# Patient Record
Sex: MALE | URBAN - METROPOLITAN AREA
[De-identification: names, ages, dates, MRNs, and addresses within clinical notes are randomized per-mention and may not be internally consistent; named-entity substitution may affect disease eponyms.]

---

## 2017-02-01 ENCOUNTER — IMPORTED ENCOUNTER (OUTPATIENT)
Dept: URBAN - METROPOLITAN AREA CLINIC 1 | Facility: CLINIC | Age: 57
End: 2017-02-01

## 2017-02-01 PROBLEM — H52.4: Noted: 2017-02-01

## 2017-02-01 PROCEDURE — S0621 ROUTINE OPHTHALMOLOGICAL EXA: HCPCS

## 2017-03-01 ENCOUNTER — IMPORTED ENCOUNTER (OUTPATIENT)
Dept: URBAN - METROPOLITAN AREA CLINIC 1 | Facility: CLINIC | Age: 57
End: 2017-03-01

## 2017-03-01 PROBLEM — H25.813: Noted: 2017-03-01

## 2017-03-01 PROBLEM — H16.143: Noted: 2017-03-01

## 2017-03-01 PROBLEM — H04.123: Noted: 2017-03-01

## 2017-03-01 PROBLEM — H01.004: Noted: 2017-03-01

## 2017-03-01 PROBLEM — H02.831: Noted: 2017-03-01

## 2017-03-01 PROBLEM — H02.834: Noted: 2017-03-01

## 2017-03-01 PROBLEM — H01.001: Noted: 2017-03-01

## 2017-03-01 PROBLEM — H10.45: Noted: 2017-03-01

## 2017-03-01 PROCEDURE — 92014 COMPRE OPH EXAM EST PT 1/>: CPT

## 2017-03-01 NOTE — PATIENT DISCUSSION
1.  Cataract OU: PMG did not see the patient today. Visually Significant secondary to glare. Discussed the risks benefits alternatives and limitations of cataract surgery. The patient stated a full understanding and a desire to proceed with the procedure. The patient will need to return for preop appointment with cataract measurements and to have any additional questions answered and start pre-operative eye drops as directed. Phaco PCL OS then OD Otherwise follow-up2. Dermatochalasis OU UL's  - Follow with no intervention at this time. VF showed 15-20 degrees difference taped OU. Will re evaluate after phaco. 3.  WINSTON w/ PEK OU-The use/continuation of artificial tears were recommended. 4.  Allergic Conjunctivitis OU :  Condition discussed with patient. Advised patient to use OTC Zaditor one drop OU BID prn.5. Blepharitis anterior type OU - Daily warm compresses and lid scrubs were recommended. Return for an appointment for Ascvince/H and P. with Dr. Saran Perez.

## 2019-02-01 ENCOUNTER — IMPORTED ENCOUNTER (OUTPATIENT)
Dept: URBAN - METROPOLITAN AREA CLINIC 1 | Facility: CLINIC | Age: 59
End: 2019-02-01

## 2019-02-01 PROBLEM — H52.13: Noted: 2019-02-01

## 2019-02-01 PROCEDURE — S0621 ROUTINE OPHTHALMOLOGICAL EXA: HCPCS

## 2019-02-01 NOTE — PATIENT DISCUSSION
1. Myopia OU- Rx for glasses given 2. Dermatochalasis OU UL's  - Follow with no intervention at this time. VF showed 15-20 degrees difference taped OU. Will re evaluate after phaco. 3.  WINSTON w/ PEK OU- Use ATs TID OU Routinely. 4.  Allergic Conjunctivitis OU - controlled. Use Zaditor BID OU PRN. 5.  Blepharitis anterior type OU - Cont Daily warm compresses and lid scrubs were recommended. 6. Cats OU- observe. Return for an appointment in 1 yr 36 with Dr. Margarita Rapp. Return for an appointment in 3 months 30 with Dr. Margarita Rapp.

## 2019-03-13 ENCOUNTER — IMPORTED ENCOUNTER (OUTPATIENT)
Dept: URBAN - METROPOLITAN AREA CLINIC 1 | Facility: CLINIC | Age: 59
End: 2019-03-13

## 2019-03-13 NOTE — PATIENT DISCUSSION
Patient wanted MRX rechecked before buying new glasses. Patient said he had the last appt of the day on 02/01/19 and he felt very rushed by tech during exam. Rechecked MRX for patient and consulted with RBF about the changes. Ok per RBF to finalize and give to patient. Patient was confident with MRX and did not feel necessary to see RBF.

## 2019-12-10 ENCOUNTER — ANTI-COAG VISIT (OUTPATIENT)
Dept: CARDIOLOGY CLINIC | Age: 59
End: 2019-12-10

## 2019-12-10 ENCOUNTER — OFFICE VISIT (OUTPATIENT)
Dept: CARDIOLOGY CLINIC | Age: 59
End: 2019-12-10

## 2019-12-10 ENCOUNTER — HOSPITAL ENCOUNTER (OUTPATIENT)
Dept: LAB | Age: 59
Discharge: HOME | End: 2019-12-10
Payer: COMMERCIAL

## 2019-12-10 VITALS
SYSTOLIC BLOOD PRESSURE: 146 MMHG | HEART RATE: 57 BPM | WEIGHT: 234 LBS | DIASTOLIC BLOOD PRESSURE: 82 MMHG | HEIGHT: 70 IN | BODY MASS INDEX: 33.5 KG/M2 | OXYGEN SATURATION: 97 %

## 2019-12-10 DIAGNOSIS — Z79.01 LONG TERM (CURRENT) USE OF ANTICOAGULANTS: Primary | ICD-10-CM

## 2019-12-10 DIAGNOSIS — R07.9 CHEST PAIN, UNSPECIFIED TYPE: Primary | ICD-10-CM

## 2019-12-10 DIAGNOSIS — R07.9 CHEST PAIN, UNSPECIFIED TYPE: ICD-10-CM

## 2019-12-10 DIAGNOSIS — I82.4Z9 DEEP VEIN THROMBOSIS (DVT) OF DISTAL VEIN OF LOWER EXTREMITY, UNSPECIFIED CHRONICITY, UNSPECIFIED LATERALITY (HCC): ICD-10-CM

## 2019-12-10 LAB
ALBUMIN SERPL-MCNC: 3.2 G/DL (ref 3.4–5)
ALBUMIN/GLOB SERPL: 0.9 {RATIO} (ref 0.8–1.7)
ALP SERPL-CCNC: 71 U/L (ref 45–117)
ALT SERPL-CCNC: 15 U/L (ref 16–61)
ANION GAP SERPL CALC-SCNC: 4 MMOL/L (ref 3–18)
AST SERPL-CCNC: 15 U/L (ref 10–38)
BASOPHILS # BLD: 0 K/UL (ref 0–0.1)
BASOPHILS NFR BLD: 0 % (ref 0–2)
BILIRUB SERPL-MCNC: 0.5 MG/DL (ref 0.2–1)
BUN SERPL-MCNC: 15 MG/DL (ref 7–18)
BUN/CREAT SERPL: 11 (ref 12–20)
CALCIUM SERPL-MCNC: 8.6 MG/DL (ref 8.5–10.1)
CHLORIDE SERPL-SCNC: 103 MMOL/L (ref 100–111)
CO2 SERPL-SCNC: 35 MMOL/L (ref 21–32)
CREAT SERPL-MCNC: 1.33 MG/DL (ref 0.6–1.3)
DIFFERENTIAL METHOD BLD: ABNORMAL
EOSINOPHIL # BLD: 0.1 K/UL (ref 0–0.4)
EOSINOPHIL NFR BLD: 1 % (ref 0–5)
ERYTHROCYTE [DISTWIDTH] IN BLOOD BY AUTOMATED COUNT: 14.5 % (ref 11.6–14.5)
GLOBULIN SER CALC-MCNC: 3.4 G/DL (ref 2–4)
GLUCOSE SERPL-MCNC: 95 MG/DL (ref 74–99)
HCT VFR BLD AUTO: 40.8 % (ref 36–48)
HGB BLD-MCNC: 13.2 G/DL (ref 13–16)
INR BLD: 2.5
LYMPHOCYTES # BLD: 2 K/UL (ref 0.9–3.6)
LYMPHOCYTES NFR BLD: 23 % (ref 21–52)
MCH RBC QN AUTO: 28.1 PG (ref 24–34)
MCHC RBC AUTO-ENTMCNC: 32.4 G/DL (ref 31–37)
MCV RBC AUTO: 87 FL (ref 74–97)
MONOCYTES # BLD: 0.5 K/UL (ref 0.05–1.2)
MONOCYTES NFR BLD: 6 % (ref 3–10)
NEUTS SEG # BLD: 6 K/UL (ref 1.8–8)
NEUTS SEG NFR BLD: 70 % (ref 40–73)
PLATELET # BLD AUTO: 253 K/UL (ref 135–420)
PMV BLD AUTO: 10.9 FL (ref 9.2–11.8)
POTASSIUM SERPL-SCNC: 3.8 MMOL/L (ref 3.5–5.5)
PROT SERPL-MCNC: 6.6 G/DL (ref 6.4–8.2)
PT POC: 29.5 SECONDS
RBC # BLD AUTO: 4.69 M/UL (ref 4.7–5.5)
SODIUM SERPL-SCNC: 142 MMOL/L (ref 136–145)
VALID INTERNAL CONTROL?: YES
WBC # BLD AUTO: 8.6 K/UL (ref 4.6–13.2)

## 2019-12-10 PROCEDURE — 80053 COMPREHEN METABOLIC PANEL: CPT

## 2019-12-10 PROCEDURE — 36415 COLL VENOUS BLD VENIPUNCTURE: CPT

## 2019-12-10 PROCEDURE — 85025 COMPLETE CBC W/AUTO DIFF WBC: CPT

## 2019-12-10 RX ORDER — ENOXAPARIN SODIUM 100 MG/ML
100 INJECTION SUBCUTANEOUS EVERY 12 HOURS
Qty: 10 SYRINGE | Refills: 1 | Status: SHIPPED | OUTPATIENT
Start: 2019-12-10 | End: 2020-02-11 | Stop reason: ALTCHOICE

## 2019-12-10 RX ORDER — AMLODIPINE BESYLATE 2.5 MG/1
2.5 TABLET ORAL DAILY
COMMUNITY
End: 2020-02-11 | Stop reason: ALTCHOICE

## 2019-12-10 NOTE — PATIENT INSTRUCTIONS
Instructions Patients Name:  Jessica Méndez 1. You are scheduled to have a Left Heart Cath on December 16, 2019  at 1030 am Please check in at 0930 am . 2. Please go to [facility] and park in the outpatient parking lot that is located around to the back of the hospital and enter through the [facility] building. Once you enter through the [facility] check in with the  there. The  will either give you directions or assist you in getting to the cath holding area. 3. You are not to eat or drink anything after midnight the night before your procedure. Small sips of water to take your medications is ok. 4. If you are diabetic, do not take your insulin/sugar pill the morning of the procedure. 5. MEDICATION INSTRUCTIONS:   Please take your morning medications with the following special instructions: 
 
[x]          Please make sure to take your Blood pressure medication :  
 
[x]          Stop your Coumadin on today  and do not resume it until after the procedure. Start Lovenox starting tomorrow night. Lovenox twice a day 12 hours apart. Hold Lovenox 12 hours prior to your procedure. 6. We encourage families to wait in the waiting room on the first floor while the procedure is being done. The Doctor will come out and talk with you as soon as the procedure is over. 7. There is the possibility that you may spend the night in the hospital, depending on the results of the procedure. This will be determined after the procedure is done. If angioplasty or stent is planned, you will stay at least one day. 8. If you or your family have any questions, please call our office Monday Friday, 9:00 a. m.4:30 p.m.,  At [phone], and ask to speak to one of the nurses.

## 2019-12-10 NOTE — PATIENT INSTRUCTIONS
Verbal order and read back per Elroy Wilburn NP The INR is stable and therapeutic. Continue same dose of coumadin (Continue Coumadin 4.5mh every day except 3mg on Tues/Thur/Sat) recheck in 1 month. This INR was taken due to having upcoming procedure.  INR is normally followed by PCP

## 2019-12-10 NOTE — PROGRESS NOTES
HISTORY OF PRESENT ILLNESS  Katlyn Marshall is a 62 y.o. male. ASSESSMENT and PLAN    Mr. Katlyn Marshall has no previous documented history of CAD. He developed fatty liver in early part of 2019. He was diagnosed with transient osteoporosis in 1999 at [facility]; he has been treated with methadone. He was told that the methadone may have played a role in developing fatty liver as well as his obesity, when he weighed 260 pounds. He also has longstanding history of DVT and PE; he is on chronic Coumadin therapy. Whenever his Coumadin is held for any kind of procedure, he developed blood clots. He has always taken Lovenox when off of Coumadin. On 11/13/2019, he presented to [facility] with severe chest pain. He was evaluated and discharged home. Within 24 hours, he returned to [facility] and was hospitalized for 5 days. He had nuclear scan performed which showed abnormal findings with small area in the anterior wall as well as medium size area in the apex and lateral wall, both reversible. His EF was noted to be 65%. He was seen by Dr. Alba Dance from [facility] cardiology who recommended heart catheterization; he comes in for second opinion. He has never smoked cigarettes. He does have hypertension but denies knowledge of hyperlipidemia. There is family history of early CAD with his father having coronary issues around the age of 39. For his blood pressure, he was tried on HCTZ which may have precipitated the pancreatitis. He was also tried on metoprolol which caused severe nausea and emesis. He was tried on amlodipine but was discontinued due to causing severe panic attacks. From cardiac standpoint, he has never had exertional angina. When he presented with severe chest pain, and pancreatitis, he was not told that he had coronary ischemia or angina. He has never smoked cigarettes or drank significant alcohol.   As noted above, his previously was about 260 pounds. He has lost about 30 pounds with dieting after he was told that he had fatty liver. We discussed at length his findings on the nuclear scan. All questions were answered. I did recommend proceeding with coronary angiography with moderate risk findings on the nuclear scan. He agrees. We will proceed with this after his INR can be normalized. We also discussed at length the possibility of taking aspirin as well as Plavix in addition to his Coumadin. While he is off the Coumadin, he will be on Lovenox. If he does receive coronary stent, he will also receive aspirin for the first month and Plavix for the first 6 months. If he requires further blood pressure control, I would like to use Imdur 30 mg daily. Obviously, if he does have CAD, he would benefit from beta-blocker and statin. As noted above, he cannot tolerate beta-blocker metoprolol caused severe nausea and vomiting. Clearance from GI physician prior to initiating statin should be obtained. Encounter Diagnoses   Name Primary?  Chest pain, unspecified type Yes     current treatment plan is effective, no change in therapy  lab results and schedule of future lab studies reviewed with patient  reviewed diet, exercise and weight control  cardiovascular risk and specific lipid/LDL goals reviewed  use of aspirin to prevent MI and TIA's discussed        HPI   Today, Mr. Magdalene Huitron has no complaints of chest pains. He does have history of chest pains when he was diagnosed with pancreatitis about a month ago. His nuclear scan at that time showed abnormal findings with anterior and lateral/apical perfusion defects which were reversible. He was seen by [facility] cardiology and heart catheterization was recommended. Review of Systems   Respiratory: Negative for shortness of breath. Cardiovascular: Positive for chest pain. Negative for palpitations, orthopnea, claudication, leg swelling and PND. Musculoskeletal: Positive for myalgias.    All other systems reviewed and are negative. Physical Exam   Constitutional: He is oriented to person, place, and time. He appears well-developed and well-nourished. HENT:   Head: Normocephalic. Eyes: Conjunctivae are normal.   Neck: Neck supple. No JVD present. Carotid bruit is not present. No thyromegaly present. Cardiovascular: Regular rhythm. Bradycardia present. Pulmonary/Chest: Breath sounds normal.   Abdominal: Bowel sounds are normal.   Musculoskeletal:         General: No edema. Neurological: He is alert and oriented to person, place, and time. Skin: Skin is warm and dry. Nursing note and vitals reviewed. PCP: Ben Chung MD    Past Medical History:   Diagnosis Date    Degenerative joint disease     Depression     Fibromyalgia     Gynecomastia     Hypogonadotropic hypogonadism ([address]     Low testosterone 2018    Sleep apnea     Testicle pain     Thrombophlebitis     Umbilical hernia        Past Surgical History:   Procedure Laterality Date    HX COLONOSCOPY      HX OTHER SURGICAL      orthopedic surgery    Cam Gutierrez       Current Outpatient Medications   Medication Sig Dispense Refill    amLODIPine (NORVASC) 2.5 mg tablet Take 2.5 mg by mouth daily.  testosterone cypionate 100 mg/mL injection by IntraMUSCular route.  warfarin (COUMADIN) 3 mg tablet Take 3 mg by mouth daily.  HYDROmorphone (DILAUDID) 4 mg tablet Take  by mouth every three (3) hours as needed for Pain.  methadone (DOLOPHINE) 10 mg tablet Take  by mouth every four (4) hours as needed for Pain.          The patient has a family history of    Social History     Tobacco Use    Smoking status: Never Smoker    Smokeless tobacco: Never Used   Substance Use Topics    Alcohol use: No    Drug use: No       No results found for: CHOL, CHOLX, CHLST, CHOLV, HDL, HDLP, LDL, LDLC, DLDLP, TGLX, TRIGL, TRIGP, CHHD, CHHDX     BP Readings from Last 3 Encounters:   12/10/19 146/82   08/17/18 142/84   08/14/18 126/90        Pulse Readings from Last 3 Encounters:   12/10/19 (!) 57       Wt Readings from Last 3 Encounters:   12/10/19 106.1 kg (234 lb)   08/17/18 112 kg (247 lb)   08/14/18 112 kg (247 lb)         EKG: unchanged from previous tracings, sinus bradycardia, Q waves in V1 with incomplete RBBB.

## 2019-12-10 NOTE — PROGRESS NOTES
Beka Farfan presents today for   Chief Complaint   Patient presents with    New Patient     self referred for 2nd opinion     Chest Pain     intermittne tightness, radiated to back, 1 week    Dizziness     sometimes     Leg Swelling     mild        Beka Farfan preferred language for health care discussion is english/other. Is someone accompanying this pt? no    Is the patient using any DME equipment during [address]? Cane     Depression Screening:  3 most recent PHQ Screens 12/10/2019   Little interest or pleasure in doing things Not at all   Feeling down, depressed, irritable, or hopeless Not at all   Total Score PHQ 2 0       Learning Assessment:  Learning Assessment 12/10/2019   PRIMARY LEARNER Patient   PRIMARY LANGUAGE ENGLISH   LEARNER PREFERENCE PRIMARY DEMONSTRATION   ANSWERED BY Patient   RELATIONSHIP SELF       Abuse Screening:  Abuse Screening Questionnaire 12/10/2019   Do you ever feel afraid of your partner? N   Are you in a relationship with someone who physically or mentally threatens you? N   Is it safe for you to go home? Y       Fall Risk  Fall Risk Assessment, last 12 mths 12/10/2019   Able to walk? Yes   Fall in past 12 months? No       Pt currently taking Anticoagulant therapy? Warfarin     Coordination of Care:  1. Have you been to the ER, urgent care clinic since your last visit? Hospitalized since your last visit? no    2. Have you seen or consulted any other health care providers outside of the [address] [place] [address] since your last visit? Include any pap smears or colon screening.  no

## 2019-12-10 NOTE — PROGRESS NOTES
Verbal order and read back per Dr. Wandy Zhong   Patient is having a cath on 16th. He will need to be bridged with lovenox starting tomorrow night until cath day. Pt given rx for 100 mg of lovenox will take every 12 hours and holding 12 hours prior to procedure.  Normal dose :Continue Coumadin 4.5mh every day except 3mg on Tues/Thur/Sat

## 2019-12-10 NOTE — PROGRESS NOTES
Verbal order and read back per Tay Gordon NP  The INR is stable and therapeutic. Continue same dose of coumadin (Continue Coumadin 4.5mh every day except 3mg on Tues/Thur/Sat)  recheck in 1 month. This INR was taken due to having upcoming procedure.  INR is normally followed by PCP

## 2019-12-16 ENCOUNTER — HOSPITAL ENCOUNTER (OUTPATIENT)
Age: 59
Setting detail: OUTPATIENT SURGERY
Discharge: HOME | End: 2019-12-16
Attending: INTERNAL MEDICINE | Admitting: INTERNAL MEDICINE
Payer: COMMERCIAL

## 2019-12-16 VITALS
HEART RATE: 52 BPM | RESPIRATION RATE: 10 BRPM | DIASTOLIC BLOOD PRESSURE: 62 MMHG | OXYGEN SATURATION: 95 % | SYSTOLIC BLOOD PRESSURE: 111 MMHG

## 2019-12-16 DIAGNOSIS — R94.39 ABNORMAL NUCLEAR STRESS TEST: ICD-10-CM

## 2019-12-16 LAB
END DIASTOLIC PRESSURE: 12
INR BLD: 1.4 (ref 0.9–1.2)

## 2019-12-16 PROCEDURE — 77030027845 HC BND COM RDL D-STAT TELE -B: Performed by: INTERNAL MEDICINE

## 2019-12-16 PROCEDURE — 74011250636 HC RX REV CODE- 250/636: Performed by: INTERNAL MEDICINE

## 2019-12-16 PROCEDURE — C1894 INTRO/SHEATH, NON-LASER: HCPCS | Performed by: INTERNAL MEDICINE

## 2019-12-16 PROCEDURE — 77030015766: Performed by: INTERNAL MEDICINE

## 2019-12-16 PROCEDURE — 85610 PROTHROMBIN TIME: CPT

## 2019-12-16 PROCEDURE — 99152 MOD SED SAME PHYS/QHP 5/>YRS: CPT | Performed by: INTERNAL MEDICINE

## 2019-12-16 PROCEDURE — 93458 L HRT ARTERY/VENTRICLE ANGIO: CPT | Performed by: INTERNAL MEDICINE

## 2019-12-16 PROCEDURE — 74011636320 HC RX REV CODE- 636/320: Performed by: INTERNAL MEDICINE

## 2019-12-16 PROCEDURE — 77030013797 HC KT TRNSDUC PRSSR EDWD -A: Performed by: INTERNAL MEDICINE

## 2019-12-16 PROCEDURE — 74011000250 HC RX REV CODE- 250: Performed by: INTERNAL MEDICINE

## 2019-12-16 PROCEDURE — 77030013744: Performed by: INTERNAL MEDICINE

## 2019-12-16 RX ORDER — SODIUM CHLORIDE 0.9 % (FLUSH) 0.9 %
5-40 SYRINGE (ML) INJECTION EVERY 8 HOURS
Status: DISCONTINUED | OUTPATIENT
Start: 2019-12-16 | End: 2019-12-16 | Stop reason: HOSPADM

## 2019-12-16 RX ORDER — VERAPAMIL HYDROCHLORIDE 2.5 MG/ML
INJECTION, SOLUTION INTRAVENOUS AS NEEDED
Status: DISCONTINUED | OUTPATIENT
Start: 2019-12-16 | End: 2019-12-16 | Stop reason: HOSPADM

## 2019-12-16 RX ORDER — HEPARIN SODIUM 1000 [USP'U]/ML
INJECTION, SOLUTION INTRAVENOUS; SUBCUTANEOUS AS NEEDED
Status: DISCONTINUED | OUTPATIENT
Start: 2019-12-16 | End: 2019-12-16 | Stop reason: HOSPADM

## 2019-12-16 RX ORDER — MIDAZOLAM HYDROCHLORIDE 1 MG/ML
INJECTION, SOLUTION INTRAMUSCULAR; INTRAVENOUS AS NEEDED
Status: DISCONTINUED | OUTPATIENT
Start: 2019-12-16 | End: 2019-12-16 | Stop reason: HOSPADM

## 2019-12-16 RX ORDER — FENTANYL CITRATE 50 UG/ML
INJECTION, SOLUTION INTRAMUSCULAR; INTRAVENOUS AS NEEDED
Status: DISCONTINUED | OUTPATIENT
Start: 2019-12-16 | End: 2019-12-16 | Stop reason: HOSPADM

## 2019-12-16 RX ORDER — SODIUM CHLORIDE 0.9 % (FLUSH) 0.9 %
5-40 SYRINGE (ML) INJECTION AS NEEDED
Status: DISCONTINUED | OUTPATIENT
Start: 2019-12-16 | End: 2019-12-16 | Stop reason: HOSPADM

## 2019-12-16 RX ORDER — LIDOCAINE HYDROCHLORIDE 10 MG/ML
INJECTION, SOLUTION EPIDURAL; INFILTRATION; INTRACAUDAL; PERINEURAL AS NEEDED
Status: DISCONTINUED | OUTPATIENT
Start: 2019-12-16 | End: 2019-12-16 | Stop reason: HOSPADM

## 2019-12-16 NOTE — DISCHARGE INSTRUCTIONS
HEART CATHETERIZATION/ANGIOGRAPHY DISCHARGE INSTRUCTIONS    1. Check puncture site frequently for swelling or bleeding. If there is any bleeding, lie down and apply pressure over the area with a clean towel or washcloth. Notify your doctor for any redness, swelling, drainage, or oozing from the puncture site. Notify your doctor for any fever or chills. 2. If the extremity becomes cold, numb, or painful call Dr. Marta Ching at [phone].  3. Activity should be limited for the next 48 hours. Climb stairs as little as possible and avoid any stooping, bending, or strenuous activity for 48 hours. No heavy lifting (anything over 10 pounds) for 3 days. 4. You may resume your usual diet. Drink more fluids than usual.  5. Have a responsible person drive you home and stay with you for at least 24 hours after your heart catheterization/angiography. 6. You may remove bandage from your Right and Arm in 24 hours. You may shower in 24 hours. No tub baths, hot tubs, or swimming for 1 week. Do not place any lotions, creams, powders, or ointments over puncture site for 1 week. You may place a clean band-aid over the puncture site each day for 5 days. Change daily. Coronary Angiogram: What to Expect at [address] [place] [address]     A coronary angiogram is a test to examine the large blood vessel of your heart (coronary artery). The doctor inserted a thin, flexible tube (catheter) into a blood vessel in your groin. In some cases, the catheter is placed in a blood vessel in the arm. Your groin or arm may have a bruise and feel sore for a day or two after a coronary angiogram. You can do light activities around the house but nothing strenuous for several days. This care sheet gives you a general idea about how long it will take for you to recover. But each person recovers at a different pace. Follow the steps below to feel better as quickly as possible. How can you care for yourself at home?   Activity  · Do not do strenuous exercise and do not lift, pull, or push anything heavy until your doctor says it is okay. This may be for a day or two. You can walk around the house and do light activity, such as cooking. · You may shower 24 to 48 hours after the procedure, if your doctor okays it. Pat the incision dry. Do not take a bath for 1 week, or until your doctor tells you it is okay. · If the catheter was placed in your groin, try not to walk up stairs for the first couple of days. · If the catheter was placed in your arm near your wrist, do not bend your wrist deeply for the first couple of days. Be careful using your hand to get into and out of a chair or bed. · If your doctor recommends it, get more exercise. Walking is a good choice. Bit by bit, increase the amount you walk every day. Try for at least 30 minutes on most days of the week. Diet  · Drink plenty of fluids to help your body flush out the dye. If you have kidney, heart, or liver disease and have to limit fluids, talk with your doctor before you increase the amount of fluids you drink. · Keep eating a heart-healthy diet that has lots of fruits, vegetables, and whole grains. If you have not been eating this way, talk to your doctor. You also may want to talk to a dietitian. This expert can help you to learn about healthy foods and plan meals. Medicines  · Your doctor will tell you if and when you can restart your medicines. He or she will also give you instructions about taking any new medicines. · If you take blood thinners, such as warfarin (Coumadin), clopidogrel (Plavix), or aspirin, be sure to talk to your doctor. He or she will tell you if and when to start taking those medicines again. Make sure that you understand exactly what your doctor wants you to do. · Your doctor may prescribe a blood-thinning medicine like aspirin or clopidogrel (Plavix).  It is very important that you take these medicines exactly as directed in order to keep the coronary artery open and reduce your risk of a heart attack. Be safe with medicines. Call your doctor if you think you are having a problem with your medicine. Care of the catheter site  · For the first 3 days, keep a bandage over the spot where the catheter was inserted. · Put ice or a cold pack on the area for 10 to 20 minutes at a time to help with soreness or swelling. Put a thin cloth between the ice and your skin. Sedation for a Medical Procedure: Care Instructions  Your Care Instructions  For a minor procedure or surgery, you will get a sedative to help you relax. This drug will make you sleepy. It is usually given in a vein (by IV). A shot may also be used to numb the area. If you had local anesthesia, you may feel some pain and discomfort as it wears off. If you have pain, don't be afraid to say so. Pain medicine works better if you take it before the pain gets bad. Common side effects from sedation include:  · Feeling sleepy. (Your doctors and nurses will make sure you are not too sleepy to go home.)  · Nausea and vomiting. This usually does not last long. · Feeling tired. Follow-up care is a key part of your treatment and safety. Be sure to make and go to all appointments, and call your doctor if you are having problems. It's also a good idea to know your test results and keep a list of the medicines you take. How can you care for yourself at home? Activity  · Don't do anything for 24 hours that requires attention to detail. It takes time for the medicine effects to completely wear off. · For your safety, you should not drive or operate any machinery that could be dangerous until the medicine wears off and you can think clearly and react easily. · Rest when you feel tired. Getting enough sleep will help you recover. Diet  · You can eat your normal diet, unless your doctor gives you other instructions. If your stomach is upset, try clear liquids and bland, low-fat foods like plain toast or rice.   · Drink plenty of fluids (unless your doctor tells you not to). · Don't drink alcohol for 24 hours. Medicines  · Be safe with medicines. Read and follow all instructions on the label. ¨ If the doctor gave you a prescription medicine for pain, take it as prescribed. ¨ If you are not taking a prescription pain medicine, ask your doctor if you can take an over-the-counter medicine. · If you think your pain medicine is making you sick to your stomach:  ¨ Take your medicine after meals (unless your doctor has told you not to). ¨ Ask your doctor for a different pain medicine. Follow-up care is a key part of your treatment and safety. Be sure to make and go to all appointments, and call your doctor if you are having problems. It's also a good idea to know your test results and keep a list of the medicines you take. When should you call for help? Call 911 anytime you think you may need emergency care. For example, call if:  · You passed out (lost consciousness). · You have severe trouble breathing. · You have sudden chest pain and shortness of breath, or you cough up blood. · You have symptoms of a heart attack. These may include:  ¨ Chest pain or pressure, or a strange feeling in the chest.  ¨ Sweating. ¨ Shortness of breath. ¨ Nausea or vomiting. ¨ Pain, pressure, or a strange feeling in the back, neck, jaw, or upper belly, or in one or both shoulders or arms. ¨ Lightheadedness or sudden weakness. ¨ A fast or irregular heartbeat. After you call 911, the  may tel you to chew 1 adult-strength or 2 to 4 low-dose aspirin. Wait for an ambulance. Do not try to drive yourself. · You have been diagnosed with angina, and you have symptoms that do not go away with rest or are not getting better within 5 minutes after you take a dose of nitroglycerin. Call your doctor now or seek immediate medical care if:  · You are bleeding from the area where the catheter was put in your artery.   · You have a fast-growing, painful lump at the catheter site. · You have signs of infection, such as:  ¨ Increased pain, swelling, warmth, or redness. ¨ Red streaks leading from the catheter site. ¨ Pus draining from the catheter site. ¨ A fever. · Your leg or arm looks blue or feels cold, numb, or tingly. These are general instructions for a healthy lifestyle:    No smoking/ No tobacco products/ Avoid exposure to second hand smoke    Surgeon General's Warning:  Quitting smoking now greatly reduces serious risk to your health. Obesity, smoking, and sedentary lifestyle greatly increases your risk for illness    A healthy diet, regular physical exercise & weight monitoring are important for maintaining a healthy lifestyle    You may be retaining fluid if you have a history of heart failure or if you experience any of the following symptoms:  Weight gain of 3 pounds or more overnight or 5 pounds in a week, increased swelling in our hands or feet or shortness of breath while lying flat in bed. Please call your doctor as soon as you notice any of these symptoms; do not wait until your next office visit. Recognize signs and symptoms of STROKE:    F-face looks uneven    A-arms unable to move or move unevenly    S-speech slurred or non-existent    T-time-call 911 as soon as signs and symptoms begin-DO NOT go       Back to bed or wait to see if you get better-TIME IS BRAIN. Warning Signs of HEART ATTACK     Call 911 if you have these symptoms:   Chest discomfort. Most heart attacks involve discomfort in the center of the chest that lasts more than a few minutes, or that goes away and comes back. It can feel like uncomfortable pressure, squeezing, fullness, or pain.  Discomfort in other areas of the upper body. Symptoms can include pain or discomfort in one or both arms, the back, neck, jaw, or stomach.  Shortness of breath with or without chest discomfort.  Other signs may include breaking out in a cold sweat, nausea, or lightheadedness.   Don't wait more than five minutes to call 911 - MINUTES MATTER! Fast action can save your life. Calling 911 is almost always the fastest way to get lifesaving treatment. Emergency Medical Services staff can begin treatment when they arrive -- up to an hour sooner than if someone gets to the hospital by car. The discharge information has been reviewed with the patient and spouse. The patient and spouse verbalized understanding. Discharge medications reviewed with the patient and spouse and appropriate educational materials and side effects teaching were provided. DISCHARGE SUMMARY from Nurse    PATIENT INSTRUCTIONS:    After general anesthesia or intravenous sedation, for 24 hours or while taking prescription Narcotics:  · Limit your activities  · Do not drive and operate hazardous machinery  · Do not make important personal or business decisions  · Do  not drink alcoholic beverages  · If you have not urinated within 8 hours after discharge, please contact your surgeon on call. Report the following to your surgeon:  · Excessive pain, swelling, redness or odor of or around the surgical area  · Temperature over 100.5  · Nausea and vomiting lasting longer than 4 hours or if unable to take medications  · Any signs of decreased circulation or nerve impairment to extremity: change in color, persistent  numbness, tingling, coldness or increase pain  · Any questions    What to do at Home:  Recommended activity: Activity as tolerated and no driving for today. If you experience any of the following symptoms fever greater than 100.5, pain not relieved by medication, please follow up with Dr. Edwige Mae. *  Please give a list of your current medications to your Primary Care Provider. *  Please update this list whenever your medications are discontinued, doses are      changed, or new medications (including over-the-counter products) are added.     *  Please carry medication information at all times in case of emergency situations. These are general instructions for a healthy lifestyle:    No smoking/ No tobacco products/ Avoid exposure to second hand smoke  Surgeon General's Warning:  Quitting smoking now greatly reduces serious risk to your health. Obesity, smoking, and sedentary lifestyle greatly increases your risk for illness    A healthy diet, regular physical exercise & weight monitoring are important for maintaining a healthy lifestyle    You may be retaining fluid if you have a history of heart failure or if you experience any of the following symptoms:  Weight gain of 3 pounds or more overnight or 5 pounds in a week, increased swelling in our hands or feet or shortness of breath while lying flat in bed. Please call your doctor as soon as you notice any of these symptoms; do not wait until your next office visit. The discharge information has been reviewed with the patient and spouse. The patient and spouse verbalized understanding. Discharge medications reviewed with the patient and spouse and appropriate educational materials and side effects teaching were provided.   ___________________________________________________________________________________________________________________________________  ___________________________________________________________________________________________________________________________________

## 2019-12-16 NOTE — PROGRESS NOTES
Patient received from Cath Lab II. Report received from [place], [place]. Patient placed in bed 3. Patient arrived with D-stat to Right wrist in place. Right wrist site is clean, dry and intact. No active bleeding or hematoma noted. Patient denies any pain or discomfort at this time. 1545- Pt up to ambulate without difficulty. Remains asymptomatic with stable right wrist site after progressive ambulation. Dressed at bedside and PIV's removed in good condition. DC instructions reviewed with verbalized understanding and copy provided. Released ambulatory to wife at vehicle in no pain.

## 2019-12-16 NOTE — H&P
Addendum:    Abnormal nuclear scan. Came in for second opinion for need for cath. Will proceed with cath. [facility]  12/16/19    HISTORY OF PRESENT ILLNESS  Mahin Walker is a 62 y.o. male.     ASSESSMENT and PLAN     Mr. Mahin Walker has no previous documented history of CAD. He developed fatty liver in early part of 2019. He was diagnosed with transient osteoporosis in 1999 at [facility]; he has been treated with methadone. He was told that the methadone may have played a role in developing fatty liver as well as his obesity, when he weighed 260 pounds. He also has longstanding history of DVT and PE; he is on chronic Coumadin therapy. Whenever his Coumadin is held for any kind of procedure, he developed blood clots. He has always taken Lovenox when off of Coumadin. On 11/13/2019, he presented to [facility] with severe chest pain. He was evaluated and discharged home. Within 24 hours, he returned to [facility] and was hospitalized for 5 days. He had nuclear scan performed which showed abnormal findings with small area in the anterior wall as well as medium size area in the apex and lateral wall, both reversible. His EF was noted to be 65%. He was seen by Dr. Irena Barger from [facility] cardiology who recommended heart catheterization; he comes in for second opinion. He has never smoked cigarettes. He does have hypertension but denies knowledge of hyperlipidemia. There is family history of early CAD with his father having coronary issues around the age of 39. For his blood pressure, he was tried on HCTZ which may have precipitated the pancreatitis. He was also tried on metoprolol which caused severe nausea and emesis. He was tried on amlodipine but was discontinued due to causing severe panic attacks. From cardiac standpoint, he has never had exertional angina.   When he presented with severe chest pain, and pancreatitis, he was not told that he had coronary ischemia or angina. He has never smoked cigarettes or drank significant alcohol. As noted above, his previously was about 260 pounds. He has lost about 30 pounds with dieting after he was told that he had fatty liver. We discussed at length his findings on the nuclear scan. All questions were answered. I did recommend proceeding with coronary angiography with moderate risk findings on the nuclear scan. He agrees. We will proceed with this after his INR can be normalized. We also discussed at length the possibility of taking aspirin as well as Plavix in addition to his Coumadin. While he is off the Coumadin, he will be on Lovenox. If he does receive coronary stent, he will also receive aspirin for the first month and Plavix for the first 6 months. If he requires further blood pressure control, I would like to use Imdur 30 mg daily. Obviously, if he does have CAD, he would benefit from beta-blocker and statin. As noted above, he cannot tolerate beta-blocker metoprolol caused severe nausea and vomiting. Clearance from GI physician prior to initiating statin should be obtained.          Encounter Diagnoses   Name Primary?  Chest pain, unspecified type Yes      current treatment plan is effective, no change in therapy  lab results and schedule of future lab studies reviewed with patient  reviewed diet, exercise and weight control  cardiovascular risk and specific lipid/LDL goals reviewed  use of aspirin to prevent MI and TIA's discussed          HPI   Today, Mr. Debbie Marsh has no complaints of chest pains. He does have history of chest pains when he was diagnosed with pancreatitis about a month ago. His nuclear scan at that time showed abnormal findings with anterior and lateral/apical perfusion defects which were reversible. He was seen by [facility] cardiology and heart catheterization was recommended.       Review of Systems   Respiratory: Negative for shortness of breath.     Cardiovascular: Positive for chest pain. Negative for palpitations, orthopnea, claudication, leg swelling and PND. Musculoskeletal: Positive for myalgias. All other systems reviewed and are negative.        Physical Exam   Constitutional: He is oriented to person, place, and time. He appears well-developed and well-nourished. HENT:   Head: Normocephalic. Eyes: Conjunctivae are normal.   Neck: Neck supple. No JVD present. Carotid bruit is not present. No thyromegaly present. Cardiovascular: Regular rhythm. Bradycardia present. Pulmonary/Chest: Breath sounds normal.   Abdominal: Bowel sounds are normal.   Musculoskeletal:         General: No edema. Neurological: He is alert and oriented to person, place, and time. Skin: Skin is warm and dry.    Nursing note and vitals reviewed.        PCP: Cydney Batista MD          Past Medical History:   Diagnosis Date    Degenerative joint disease      Depression      Fibromyalgia      Gynecomastia      Hypogonadotropic hypogonadism ([facility] [address]      Low testosterone 2018    Sleep apnea      Testicle pain      Thrombophlebitis      Umbilical hernia                 Past Surgical History:   Procedure Laterality Date    HX COLONOSCOPY        HX OTHER SURGICAL         orthopedic surgery    HX TONSILLECTOMY        HX VASECTOMY         Dr. Robinson Collins                Current Outpatient Medications   Medication Sig Dispense Refill    amLODIPine (NORVASC) 2.5 mg tablet Take 2.5 mg by mouth daily.        testosterone cypionate 100 mg/mL injection by IntraMUSCular route.        warfarin (COUMADIN) 3 mg tablet Take 3 mg by mouth daily.        HYDROmorphone (DILAUDID) 4 mg tablet Take  by mouth every three (3) hours as needed for Pain.        methadone (DOLOPHINE) 10 mg tablet Take  by mouth every four (4) hours as needed for Pain.             The patient has a family history of     Social History           Tobacco Use    Smoking status: Never Smoker    Smokeless tobacco: Never Used Substance Use Topics    Alcohol use: No    Drug use: No         No results found for: CHOL, CHOLX, CHLST, CHOLV, HDL, HDLP, LDL, LDLC, DLDLP, TGLX, TRIGL, TRIGP, CHHD, CHHDX          BP Readings from Last 3 Encounters:   12/10/19 146/82   08/17/18 142/84   08/14/18 126/90         Pulse Readings from Last 3 Encounters:   12/10/19 (!) 57             Wt Readings from Last 3 Encounters:   12/10/19 106.1 kg (234 lb)   08/17/18 112 kg (247 lb)   08/14/18 112 kg (247 lb)            EKG: unchanged from previous tracings, sinus bradycardia, Q waves in V1 with incomplete RBBB.

## 2019-12-16 NOTE — Clinical Note
Right groin and right radial prepped with ChloraPrep and draped. Wet prep solution applied at: 1105. Wet prep solution dried at: 1108. Wet prep elapsed drying time: 3 mins.

## 2019-12-16 NOTE — Clinical Note
TRANSFER - IN REPORT:     Verbal report received from: RODY TAY RN. Report consisted of patient's Situation, Background, Assessment and   Recommendations(SBAR). Opportunity for questions and clarification was provided. Assessment completed upon patient's arrival to unit and care assumed. Patient transported with a Cardiac Cath Tech / Patient Care Tech.

## 2019-12-16 NOTE — Clinical Note
Contrast Dose Calculator:   Patient's age: 62.   Patient's sex: Male. Patient weight (kg) = 104.3. Creatinine level (mg/dL) = 1.33. Creatinine clearance (mL/min): 86.   Contrast concentration (mg/mL) = 300. MACD = 300 mL. Max Contrast dose per Creatinine Cl calculator = 193.5 mL.

## 2019-12-16 NOTE — Clinical Note
TRANSFER - OUT REPORT:     Verbal report given to: KEIRA ROONEY . Report consisted of patient's Situation, Background, Assessment and   Recommendations(SBAR). Opportunity for questions and clarification was provided. Patient transported with a Cardiac Cath Tech / Patient Care Tech. Patient transported to: [address].

## 2019-12-16 NOTE — PROGRESS NOTES
Patient escorted to cath holding from waiting area ambulatory, alert and oriented x 4. Changed into gown and placed on monitor. NPO since MN. Lab results, med rec and H&P reviewed on chart. PIV x 2 inserted without difficulty. Bilateral groin prep for the procedure. Family to bedside.

## 2020-02-11 ENCOUNTER — OFFICE VISIT (OUTPATIENT)
Dept: CARDIOLOGY CLINIC | Age: 60
End: 2020-02-11

## 2020-02-11 VITALS
SYSTOLIC BLOOD PRESSURE: 132 MMHG | OXYGEN SATURATION: 97 % | DIASTOLIC BLOOD PRESSURE: 82 MMHG | BODY MASS INDEX: 33.93 KG/M2 | WEIGHT: 237 LBS | HEART RATE: 48 BPM | HEIGHT: 70 IN

## 2020-02-11 DIAGNOSIS — Z79.01 LONG TERM (CURRENT) USE OF ANTICOAGULANTS: ICD-10-CM

## 2020-02-11 DIAGNOSIS — I82.4Z9 DEEP VEIN THROMBOSIS (DVT) OF DISTAL VEIN OF LOWER EXTREMITY, UNSPECIFIED CHRONICITY, UNSPECIFIED LATERALITY (HCC): ICD-10-CM

## 2020-02-11 DIAGNOSIS — R07.9 CHEST PAIN, UNSPECIFIED TYPE: Primary | ICD-10-CM

## 2020-02-11 DIAGNOSIS — R94.39 ABNORMAL NUCLEAR STRESS TEST: ICD-10-CM

## 2020-02-11 RX ORDER — GUAIFENESIN 100 MG/5ML
81 LIQUID (ML) ORAL DAILY
COMMUNITY
End: 2020-02-11

## 2020-02-11 RX ORDER — LOSARTAN POTASSIUM 50 MG/1
50 TABLET ORAL DAILY
Qty: 30 TAB | Refills: 5 | Status: SHIPPED | OUTPATIENT
Start: 2020-02-11 | End: 2020-03-03

## 2020-02-11 NOTE — PROGRESS NOTES
HISTORY OF PRESENT ILLNESS  Talya London is a 56 y.o. male. ASSESSMENT and PLAN    Mr. Talya London has no previous documented history of CAD. He developed fatty liver in early part of 2019. He was diagnosed with transient osteoporosis in 1999 at [facility]; he has been treated with methadone. He was told that the methadone may have played a role in developing fatty liver as well as his obesity, when he weighed 260 pounds. He also has longstanding history of DVT and PE; he is on chronic Coumadin therapy. Whenever his Coumadin is held for any kind of procedure, he developed blood clots. He has always taken Lovenox when off of Coumadin. On 11/13/2019, he presented to [facility] with severe chest pain. He was evaluated and discharged home. Within 24 hours, he returned to [facility] and was hospitalized for 5 days. He had nuclear scan performed which showed abnormal findings with small area in the anterior wall as well as medium size area in the apex and lateral wall, both reversible. His EF was noted to be 65%. He was seen by Dr. Julio Diana from [facility] cardiology who recommended heart catheterization; he comes in for second opinion. He has never smoked cigarettes. He does have hypertension but denies knowledge of hyperlipidemia. There is family history of early CAD with his father having coronary issues around the age of 39. For his blood pressure, he was tried on HCTZ which may have precipitated the pancreatitis. He was also tried on metoprolol which caused severe nausea and emesis. He was tried on amlodipine but was discontinued due to causing severe panic attacks. He underwent coronary angiography in December 2019 which showed widely patent, right dominant epicardial coronary circulation. His EF was noted to be normal at 60%. · CAD:    No further episodes of chest pain noted. No CAD. · BP:   Well controlled.   However, he has brought his blood pressure diary which shows frequent episodes of systolic blood pressure around 165 mmHg. Because of panic attacks, he cannot tolerate amlodipine in the past.  I will try Cozaar 50 mg daily. · HR:    Asymptomatic bradycardia on low-dose beta-blockers. Because of the fact that he has no significant CAD, I have discontinued metoprolol and he will be started on Cozaar for blood pressure control. · CHF:    Currently, there is no evidence of decompensated CHF noted. · Weight:    His weight today is 237 pounds. He has lost significant weight over the last 2 years. However, for better blood pressure control and health, I would recommend to try to get down to about 210 pounds. · Cholesterol:   Target LDL <70. · Anti-platelet:   Remains on Coumadin. I will see him back annually. Thank you. Encounter Diagnoses   Name Primary?  Chest pain, unspecified type Yes    Abnormal nuclear stress test     Deep vein thrombosis (DVT) of distal vein of lower extremity, unspecified chronicity, unspecified laterality (HCC)     Long term (current) use of anticoagulants      current treatment plan is effective, no change in therapy  lab results and schedule of future lab studies reviewed with patient  reviewed diet, exercise and weight control        HPI   Today, Mr. Yovana Aguilera has no complaints of chest pains, or increased shortness of breath. He is concerned about it hypertension. He denies any changes in his exercise capacity from previous. He denies orthopnea or PND. He denies palpitations or dizziness. He tolerated coronary angiography back in December 2019 without difficulty. Review of Systems   Respiratory: Negative for shortness of breath. Cardiovascular: Negative for chest pain, palpitations, orthopnea, claudication, leg swelling and PND. All other systems reviewed and are negative. Physical Exam   Constitutional: He is oriented to person, place, and time.  He appears well-developed and well-nourished. HENT:   Head: Normocephalic. Eyes: Conjunctivae are normal.   Neck: Neck supple. No JVD present. Carotid bruit is not present. No thyromegaly present. Cardiovascular: Regular rhythm. Bradycardia present. Pulmonary/Chest: Breath sounds normal.   Abdominal: Bowel sounds are normal.   Musculoskeletal:         General: No edema. Neurological: He is alert and oriented to person, place, and time. Skin: Skin is warm and dry. Nursing note and vitals reviewed. PCP: Soy Alonzo MD    Past Medical History:   Diagnosis Date    Degenerative joint disease     Depression     Fibromyalgia     Gynecomastia     Hypogonadotropic hypogonadism ([address]     Low testosterone 2018    Sleep apnea     Testicle pain     Thrombophlebitis     Umbilical hernia        Past Surgical History:   Procedure Laterality Date    HX COLONOSCOPY      HX OTHER SURGICAL      orthopedic surgery    Sara Hugo       Current Outpatient Medications   Medication Sig Dispense Refill    losartan (COZAAR) 50 mg tablet Take 1 Tab by mouth daily. 30 Tab 5    warfarin (COUMADIN) 3 mg tablet Take 3 mg by mouth daily.  HYDROmorphone (DILAUDID) 4 mg tablet Take  by mouth every three (3) hours as needed for Pain.  methadone (DOLOPHINE) 10 mg tablet Take  by mouth every four (4) hours as needed for Pain.          The patient has a family history of    Social History     Tobacco Use    Smoking status: Never Smoker    Smokeless tobacco: Never Used   Substance Use Topics    Alcohol use: No    Drug use: No       No results found for: CHOL, CHOLX, CHLST, CHOLV, HDL, HDLP, LDL, LDLC, DLDLP, TGLX, TRIGL, TRIGP, CHHD, CHHDX     BP Readings from Last 3 Encounters:   02/11/20 132/82   12/16/19 111/62   12/10/19 146/82        Pulse Readings from Last 3 Encounters:   02/11/20 (!) 48   12/16/19 (!) 52   12/10/19 (!) 57       Wt Readings from Last 3 Encounters:   02/11/20 107.5 kg (237 lb)   12/10/19 106.1 kg (234 lb)   08/17/18 112 kg (247 lb)         EKG: sinus bradycardia, incomplete RBBB.

## 2020-02-11 NOTE — PROGRESS NOTES
Yesi Das presents today for   Chief Complaint   Patient presents with   [facility] Follow Up     follow up after cath     Leg Swelling     mild       Yesi Das preferred language for health care discussion is english/other. Is someone accompanying this pt? no    Is the patient using any DME equipment during [address]? cane    Depression Screening:  3 most recent PHQ Screens 2/11/2020   Little interest or pleasure in doing things Not at all   Feeling down, depressed, irritable, or hopeless Not at all   Total Score PHQ 2 0       Learning Assessment:  Learning Assessment 2/11/2020   PRIMARY LEARNER Patient   PRIMARY LANGUAGE ENGLISH   LEARNER PREFERENCE PRIMARY DEMONSTRATION   ANSWERED BY Patient   RELATIONSHIP SELF       Abuse Screening:  Abuse Screening Questionnaire 2/11/2020   Do you ever feel afraid of your partner? N   Are you in a relationship with someone who physically or mentally threatens you? N   Is it safe for you to go home? Y       Fall Risk  Fall Risk Assessment, last 12 mths 2/11/2020   Able to walk? Yes   Fall in past 12 months? No       Pt currently taking Anticoagulant therapy? ASA 81mg every day     Coordination of Care:  1. Have you been to the ER, urgent care clinic since your last visit? Hospitalized since your last visit? 12/16/19 for cath     2. Have you seen or consulted any other health care providers outside of the [address] [place] [address] since your last visit? Include any pap smears or colon screening.  no

## 2020-02-11 NOTE — PATIENT INSTRUCTIONS
Stop Metoprolol Stop Aspirin Start Cozaar 50mg once daily Blood pressure appointment in 2 weeks Follow up 1 year

## 2020-02-12 ENCOUNTER — IMPORTED ENCOUNTER (OUTPATIENT)
Dept: URBAN - METROPOLITAN AREA CLINIC 1 | Facility: CLINIC | Age: 60
End: 2020-02-12

## 2020-02-12 PROBLEM — H52.13: Noted: 2020-02-12

## 2020-02-12 PROBLEM — H52.4: Noted: 2020-02-12

## 2020-02-12 PROBLEM — H52.223: Noted: 2020-02-12

## 2020-02-12 PROCEDURE — S0621 ROUTINE OPHTHALMOLOGICAL EXA: HCPCS

## 2020-02-12 NOTE — PATIENT DISCUSSION
1. Myopia w/ Astigmatism OU -- Rx was given for correction if indicated and requested. 2. Presbyopia3. Cataracts OU -- Observe. 4. WINSTON w/ PEK OU- Use ATs TID OU Routinely. 5.  Allergic Conjunctivitis OU -- Use Zaditor BID OU PRN. 6.  Dermatochalasis OU UL's  -- Follow with no intervention at this time. 7. Blepharitis OU -- Cont Daily warm compresses and lid scrubs were recommended. Return for an appointment in 1 month for a 30/glare with Dr. Margarita Rapp.

## 2020-03-03 ENCOUNTER — CLINICAL SUPPORT (OUTPATIENT)
Dept: CARDIOLOGY CLINIC | Age: 60
End: 2020-03-03

## 2020-03-03 VITALS
WEIGHT: 227 LBS | HEART RATE: 66 BPM | DIASTOLIC BLOOD PRESSURE: 92 MMHG | HEIGHT: 70 IN | BODY MASS INDEX: 32.5 KG/M2 | SYSTOLIC BLOOD PRESSURE: 160 MMHG | OXYGEN SATURATION: 96 %

## 2020-03-03 DIAGNOSIS — I10 ESSENTIAL HYPERTENSION: Primary | ICD-10-CM

## 2020-03-03 RX ORDER — LOSARTAN POTASSIUM 100 MG/1
100 TABLET ORAL DAILY
Qty: 30 TAB | Refills: 6 | Status: SHIPPED | OUTPATIENT
Start: 2020-03-03 | End: 2020-09-24

## 2020-06-03 ENCOUNTER — IMPORTED ENCOUNTER (OUTPATIENT)
Dept: URBAN - METROPOLITAN AREA CLINIC 1 | Facility: CLINIC | Age: 60
End: 2020-06-03

## 2020-06-03 PROBLEM — H10.45: Noted: 2020-06-03

## 2020-06-03 PROBLEM — H16.143: Noted: 2020-06-03

## 2020-06-03 PROBLEM — H04.123: Noted: 2020-06-03

## 2020-06-03 PROBLEM — H25.813: Noted: 2020-06-03

## 2020-06-03 PROCEDURE — 92015 DETERMINE REFRACTIVE STATE: CPT

## 2020-06-03 PROCEDURE — 92014 COMPRE OPH EXAM EST PT 1/>: CPT

## 2020-06-03 NOTE — PATIENT DISCUSSION
1.  Cataract OU --  Visually Significant secondary to Glare OU. Discussed the risks benefits alternatives and limitations of cataract surgery. The patient stated a full understanding and a desire to proceed with the procedure. The patient will need to return for preop appointment with cataract measurements and to have any additional questions answered and start pre-operative eye drops as directed. Did not see PMG. Phaco PCL OS then ODOtherwise follow-up2. WINSTON w/ PEK OU -- Use ATs TID OU Routinely. 3.  Allergic Conjunctivitis OU -- Use Zaditor BID OU PRN. 4.  Dermatochalasis OU UL's  -- Consider work up for [employer] post phaco. 5.  Blepharitis OU -- Cont Daily warm compresses and lid scrubs were recommended. Return for an appointment in 10/Ascan/HP with Dr. Sanjay Chris.

## 2020-06-05 ENCOUNTER — IMPORTED ENCOUNTER (OUTPATIENT)
Dept: URBAN - METROPOLITAN AREA CLINIC 1 | Facility: CLINIC | Age: 60
End: 2020-06-05

## 2020-06-05 PROBLEM — H35.3131: Noted: 2020-06-05

## 2020-06-05 PROBLEM — H25.813: Noted: 2020-06-05

## 2020-06-05 PROCEDURE — 92136 OPHTHALMIC BIOMETRY: CPT

## 2020-06-05 NOTE — PATIENT DISCUSSION
1.  Cataract OU:  Visually Significant secondary to glare discussed the risks benefits alternatives and limitations of cataract surgery. The patient stated a full understanding and a desire to proceed with the procedure. Discussed with patient if PO Gtts are more than $120 for all three combined when filling at their Pharmacy please call our office to request generic substitutions. Pt understands they will need glasses post-op to achieve their best corrected vision. Phaco PCL OS then OD Phaco PCL OS *Myopic Goal*. Lifestyle Questionnaire Completed. 2.  ARMD OU Early/dry/stable. Importance of daily AREDS II study multivitamin and Amsler Grid checks discussed with patient. Patient to follow-up immediately with any new onset of decreased vision and/or metamorphopsia. 3. WINSTON w/ PEK OU - Use ATs TID OU Routinely. 4.  Allergic Conjunctivitis OU - Use Zaditor BID OU PRN. 5.  Dermatochalasis OU UL's  - Consider work up for [employer] post phaco. 6.  Blepharitis OU - Cont Daily warm compresses and lid scrubs were recommended. 7. PVD OS - RD precautionsReturn for an appointment for Return as scheduled with Dr. Irais Prieto.

## 2020-06-05 NOTE — PATIENT DISCUSSION
ARMD OU Early/dry/stable. Importance of daily AREDS II study multivitamin and Amsler Grid checks discussed with patient. Patient to follow-up immediately with any new onset of decreased vision and/or metamorphopsia. 3. WINSTON w/ PEK OU - Use ATs TID OU Routinely. 4.  Allergic Conjunctivitis OU - Use Zaditor BID OU PRN. 5.  Dermatochalasis OU UL's  - Consider work up for [employer] post phaco. 6.  Blepharitis OU - Cont Daily warm compresses and lid scrubs were recommended. 7.   PVD OS - RD precautions

## 2020-06-10 ENCOUNTER — IMPORTED ENCOUNTER (OUTPATIENT)
Dept: URBAN - METROPOLITAN AREA CLINIC 1 | Facility: CLINIC | Age: 60
End: 2020-06-10

## 2020-06-11 ENCOUNTER — IMPORTED ENCOUNTER (OUTPATIENT)
Dept: URBAN - METROPOLITAN AREA CLINIC 1 | Facility: CLINIC | Age: 60
End: 2020-06-11

## 2020-06-11 PROBLEM — Z96.1: Noted: 2020-06-11

## 2020-06-11 PROCEDURE — 99024 POSTOP FOLLOW-UP VISIT: CPT

## 2020-06-11 NOTE — PATIENT DISCUSSION
POD#1 CE/IOL OS (Standard) doing well. *Myopic Goal*. Use Prednisolone BID OS Bromsite Qdaily OS Ocuflox TID OS : Use all three gtts through completion of PO gtt chart regimen/ Per our instructions given to patient.   Post op Warnings Reiterated RTC as scheduled

## 2020-06-16 ENCOUNTER — IMPORTED ENCOUNTER (OUTPATIENT)
Dept: URBAN - METROPOLITAN AREA CLINIC 1 | Facility: CLINIC | Age: 60
End: 2020-06-16

## 2020-06-16 PROBLEM — H25.811: Noted: 2020-06-16

## 2020-06-16 PROCEDURE — 92136 OPHTHALMIC BIOMETRY: CPT

## 2020-06-16 NOTE — PATIENT DISCUSSION
1.  Cataract OD -- Visually Significant secondary to glare discussed the risks benefits alternatives and limitations of cataract surgery. The patient stated a full understanding and a desire to proceed with the procedure. Discussed with patient if PO Gtts are more than $120 for all three combined when filling at their Pharmacy please call our office to request generic substitutions. *Myopic Goal. Pt understands they will need glasses post-op to achieve their best corrected vision. Phaco PCL OD 2. POW#3  CE/IOL (Standard) doing well. *Myopic Goal*. Use Prednisolone BID OS Bromsite Qdaily OS: Use gtts through completion of PO gtt chart regimen.  F/u as scheduled 2nd eye

## 2020-06-25 ENCOUNTER — IMPORTED ENCOUNTER (OUTPATIENT)
Dept: URBAN - METROPOLITAN AREA CLINIC 1 | Facility: CLINIC | Age: 60
End: 2020-06-25

## 2020-06-25 PROBLEM — Z96.1: Noted: 2020-06-25

## 2020-06-25 PROCEDURE — 99024 POSTOP FOLLOW-UP VISIT: CPT

## 2020-06-25 NOTE — PATIENT DISCUSSION
1. POD#1 Phaco/ PCL OD (Standard) - doing well. *Myopic Goal*. Use Prednisolone BID OD Bromsite Qdaily OD Ocuflox TID OD : Use all three gtts through completion of PO gtt chart regimen/ Per our instructions given. Post op Warnings Reiterated 2. POW#3 Phaco/ PCL OS (Standard) - doing well *Myopic Goal*. Use Prednisolone BID OS and Bromsite Qdaily OS: Use gtts through completion of PO gtt regimen.  RTC as scheduled

## 2020-06-30 ENCOUNTER — IMPORTED ENCOUNTER (OUTPATIENT)
Dept: URBAN - METROPOLITAN AREA CLINIC 1 | Facility: CLINIC | Age: 60
End: 2020-06-30

## 2020-06-30 PROBLEM — H04.123: Noted: 2020-06-30

## 2020-06-30 PROBLEM — H16.143: Noted: 2020-06-30

## 2020-06-30 PROBLEM — H10.45: Noted: 2020-06-30

## 2020-06-30 PROCEDURE — 99024 POSTOP FOLLOW-UP VISIT: CPT

## 2020-06-30 NOTE — PATIENT DISCUSSION
1.  PEK OU OD>OS -- PEK increased OU likely secondary to BromSite gtt. Recommend PF ATs Q2H OU can increase frequncy as needed. 2. Phaco/PCL -- Standard OU w/ Myopic Target. Cont po gtts as per PO gtt regimen.  F/u as scheduled for KR

## 2020-07-24 ENCOUNTER — IMPORTED ENCOUNTER (OUTPATIENT)
Dept: URBAN - METROPOLITAN AREA CLINIC 1 | Facility: CLINIC | Age: 60
End: 2020-07-24

## 2020-07-24 PROBLEM — Z96.1: Noted: 2020-07-24

## 2020-07-24 PROCEDURE — 99024 POSTOP FOLLOW-UP VISIT: CPT

## 2020-07-24 NOTE — PATIENT DISCUSSION
POM#1 CE/IOL OU doing well. *Myopic Goal*. Use Prednisolone BID OD & Bromsite Qdaily OD: Use through completion of po gtt regimen. MRX for glasses given. Return for an appointment in 6 months 30/Ptosis HVF with Dr. Deborah Long.

## 2020-09-24 RX ORDER — LOSARTAN POTASSIUM 100 MG/1
TABLET ORAL
Qty: 30 TAB | Refills: 6 | Status: SHIPPED | OUTPATIENT
Start: 2020-09-24 | End: 2021-04-22

## 2021-01-07 ENCOUNTER — OFFICE VISIT (OUTPATIENT)
Dept: CARDIOLOGY CLINIC | Age: 61
End: 2021-01-07
Payer: MEDICAID

## 2021-01-07 VITALS
DIASTOLIC BLOOD PRESSURE: 74 MMHG | HEART RATE: 62 BPM | WEIGHT: 244 LBS | OXYGEN SATURATION: 98 % | HEIGHT: 70 IN | SYSTOLIC BLOOD PRESSURE: 160 MMHG | BODY MASS INDEX: 34.93 KG/M2

## 2021-01-07 DIAGNOSIS — Z79.01 LONG TERM (CURRENT) USE OF ANTICOAGULANTS: ICD-10-CM

## 2021-01-07 DIAGNOSIS — I10 ESSENTIAL HYPERTENSION: ICD-10-CM

## 2021-01-07 DIAGNOSIS — I82.4Z9 DEEP VEIN THROMBOSIS (DVT) OF DISTAL VEIN OF LOWER EXTREMITY, UNSPECIFIED CHRONICITY, UNSPECIFIED LATERALITY (HCC): ICD-10-CM

## 2021-01-07 DIAGNOSIS — R94.39 ABNORMAL NUCLEAR STRESS TEST: Primary | ICD-10-CM

## 2021-01-07 PROCEDURE — 93000 ELECTROCARDIOGRAM COMPLETE: CPT | Performed by: INTERNAL MEDICINE

## 2021-01-07 PROCEDURE — 99214 OFFICE O/P EST MOD 30 MIN: CPT | Performed by: INTERNAL MEDICINE

## 2021-01-07 RX ORDER — TESTOSTERONE CYPIONATE 200 MG/ML
INJECTION INTRAMUSCULAR
COMMUNITY

## 2021-01-07 RX ORDER — WARFARIN 3 MG/1
3 TABLET ORAL DAILY
Qty: 90 TAB | Refills: 3 | Status: SHIPPED | OUTPATIENT
Start: 2021-01-07 | End: 2022-01-11 | Stop reason: SDUPTHER

## 2021-01-07 NOTE — PROGRESS NOTES
HISTORY OF PRESENT ILLNESS  Abigail Oseguera is a 56 y.o. male. ASSESSMENT and PLAN    Mr. Abigail Oseguera has no previous documented history of CAD.  He developed fatty liver in early part of 2019. Rose Nunez was diagnosed with transient osteoporosis in 1999 at [facility]; he has been treated with methadone. Rose Nunez was told that the methadone may have played a role in developing fatty liver as well as his obesity, when he weighed 260 pounds. He also has longstanding history of DVT and PE; he is on chronic Coumadin therapy.  Whenever his Coumadin is held for any kind of procedure, he developed blood clots.  He has always taken Lovenox when off of Coumadin. On 11/13/2019, he presented to [facility] with severe chest pain. Rose Nunez was evaluated and discharged home.  Within 24 hours, he returned to [facility] and was hospitalized for 5 days. Rose Nunez had nuclear scan performed which showed abnormal findings with small area in the anterior wall as well as medium size area in the apex and lateral wall, both reversible.  His EF was noted to be 65%.  He was seen by Dr. Jorge Luis Sevilla from [facility] cardiology who recommended heart catheterization; he comes in for second opinion. He has never smoked cigarettes. Rose Nunez does have hypertension but denies knowledge of hyperlipidemia. Chaitanya Lira is family history of early CAD with his father having coronary issues around the age of 39.  For his blood pressure, he was tried on HCTZ which may have precipitated the pancreatitis. Rose Nunez was also tried on metoprolol which caused severe nausea and emesis.  He was tried on amlodipine but was discontinued due to causing severe panic attacks. He underwent coronary angiography in December 2019 which showed widely patent, right dominant epicardial coronary circulation. His EF was noted to be normal at 60%.  CAD:    He has no CAD documented by coronary angiography in 2019.  BP:    Mildly elevated today.   Instead of going up on his medications, I advised him to try to lose weight that he had recently gained. He states that his blood pressure cuff at home measures normal blood pressure. I have asked him to bring in his home blood pressure cuff and have a correlated with our office. If there are no significant differences, I would follow the home machine. This was discussed at length. Over 25 minutes spent on discussion alone.  Rhythm:    Normal sinus rhythm.  CHF:    There is no evidence of decompensated CHF noted.  Weight:    His weight today is 244 pounds. His baseline weight is 237 pounds. He is trying to get down about 210 pounds. This was encouraged.  Cholesterol:  Target LDL<90.   Anti-coagulation:  Remains on Coumadin for prior history of DVTs. I will see him back annually. Thank you. Encounter Diagnoses   Name Primary?  Abnormal nuclear stress test Yes    Essential hypertension     Deep vein thrombosis (DVT) of distal vein of lower extremity, unspecified chronicity, unspecified laterality ([address]     Long term (current) use of anticoagulants      current treatment plan is effective, no change in therapy  lab results and schedule of future lab studies reviewed with patient  reviewed diet, exercise and weight control  cardiovascular risk and specific lipid/LDL goals reviewed  use of aspirin to prevent MI and TIA's discussed      HPI   Today, Mr. Barbi Holman has no complaints of chest pains, increased shortness of breath or decreased exercise capacity. He denies any orthopnea or PND. He denies any palpitations or dizziness. Unfortunately, he has put on another 7 pounds. He is worried about his blood pressure. Review of Systems   Respiratory: Negative for shortness of breath. Cardiovascular: Negative for chest pain, palpitations, orthopnea, claudication, leg swelling and PND. All other systems reviewed and are negative. Physical Exam  Vitals signs and nursing note reviewed. Constitutional:       Appearance: Normal appearance. HENT:      Head: Normocephalic. Eyes:      Conjunctiva/sclera: Conjunctivae normal.   Neck:      Musculoskeletal: No neck rigidity. Cardiovascular:      Rate and Rhythm: Normal rate and regular rhythm. Pulmonary:      Breath sounds: Normal breath sounds. Abdominal:      Palpations: Abdomen is soft. Musculoskeletal:         General: No swelling. Skin:     General: Skin is warm and dry. Neurological:      General: No focal deficit present. Mental Status: He is alert and oriented to person, place, and time. Psychiatric:         Mood and Affect: Mood normal.         Behavior: Behavior normal.         PCP: Jose Elias Shannon MD    Past Medical History:   Diagnosis Date    Degenerative joint disease     Depression     Fibromyalgia     Gynecomastia     [address]ogonadotropic hypogonadism ([facility] [address]     Low testosterone 2018    Sleep apnea     Testicle pain     Thrombophlebitis     Umbilical hernia        Past Surgical History:   Procedure Laterality Date    HX COLONOSCOPY      HX OTHER SURGICAL      orthopedic surgery    Alea Govea       Current Outpatient Medications   Medication Sig Dispense Refill    warfarin (Coumadin) 3 mg tablet Take 1 Tab by mouth daily. 90 Tab 3    losartan (COZAAR) 100 mg tablet take 1 tablet by mouth daily 30 Tab 6    HYDROmorphone (DILAUDID) 4 mg tablet Take  by mouth every three (3) hours as needed for Pain.  methadone (DOLOPHINE) 10 mg tablet Take  by mouth every four (4) hours as needed for Pain.       testosterone cypionate (DEPOTESTOTERONE CYPIONATE) 200 mg/mL injection testosterone cypionate 200 mg/mL intramuscular oil   USE ONE ML Q 3 WEEKS         The patient has a family history of    Social History     Tobacco Use    Smoking status: Never Smoker    Smokeless tobacco: Never Used   Substance Use Topics    Alcohol use: No    Drug use: No       No results found for: CHOL, CHOLX, CHLST, CHOLV, HDL, HDLP, LDL, LDLC, DLDLP, TGLX, TRIGL, TRIGP, CHHD, CHHDX     BP Readings from Last 3 Encounters:   01/07/21 (!) 160/74   03/03/20 (!) 160/92   02/11/20 132/82        Pulse Readings from Last 3 Encounters:   01/07/21 62   03/03/20 66   02/11/20 (!) 48       Wt Readings from Last 3 Encounters:   01/07/21 110.7 kg (244 lb)   03/03/20 103 kg (227 lb)   02/11/20 107.5 kg (237 lb)         EKG: unchanged from previous tracings, normal sinus rhythm, incomplete RBBB.

## 2021-01-07 NOTE — PROGRESS NOTES
Debbie Lightsyed presents today for   Chief Complaint   Patient presents with    Follow-up     ABN EKG       Debbie Shay preferred language for health care discussion is english/other. Is someone accompanying this pt? no    Is the patient using any DME equipment during [address]? no    Depression Screening:  3 most recent PHQ Screens 2/11/2020   Little interest or pleasure in doing things Not at all   Feeling down, depressed, irritable, or hopeless Not at all   Total Score PHQ 2 0       Learning Assessment:  Learning Assessment 2/11/2020   PRIMARY LEARNER Patient   PRIMARY LANGUAGE ENGLISH   LEARNER PREFERENCE PRIMARY DEMONSTRATION   ANSWERED BY Patient   RELATIONSHIP SELF       Abuse Screening:  Abuse Screening Questionnaire 2/11/2020   Do you ever feel afraid of your partner? N   Are you in a relationship with someone who physically or mentally threatens you? N   Is it safe for you to go home? Y       Fall Risk  Fall Risk Assessment, last 12 mths 2/11/2020   Able to walk? Yes   Fall in past 12 months? No       Pt currently taking Anticoagulant therapy? Warfarin     Coordination of Care:  1. Have you been to the ER, urgent care clinic since your last visit? Hospitalized since your last visit? no    2. Have you seen or consulted any other health care providers outside of the [address] [place] [address] since your last visit? Include any pap smears or colon screening.  no

## 2021-01-15 ENCOUNTER — TELEPHONE (OUTPATIENT)
Dept: CARDIOLOGY CLINIC | Age: 61
End: 2021-01-15

## 2021-01-15 ENCOUNTER — OFFICE VISIT (OUTPATIENT)
Dept: CARDIOLOGY CLINIC | Age: 61
End: 2021-01-15
Payer: MEDICAID

## 2021-01-15 VITALS
WEIGHT: 240.8 LBS | BODY MASS INDEX: 34.55 KG/M2 | SYSTOLIC BLOOD PRESSURE: 158 MMHG | DIASTOLIC BLOOD PRESSURE: 90 MMHG | OXYGEN SATURATION: 97 % | HEART RATE: 61 BPM

## 2021-01-15 DIAGNOSIS — I10 ESSENTIAL HYPERTENSION: Primary | ICD-10-CM

## 2021-01-15 PROCEDURE — 99212 OFFICE O/P EST SF 10 MIN: CPT | Performed by: NURSE PRACTITIONER

## 2021-01-15 RX ORDER — SPIRONOLACTONE 25 MG/1
25 TABLET ORAL DAILY
Qty: 30 TAB | Refills: 3 | Status: SHIPPED | OUTPATIENT
Start: 2021-01-15 | End: 2021-05-19

## 2021-01-15 NOTE — PROGRESS NOTES
Chief Complaint : BP follow up    HPI:  Leilani Lai is a 56 y.o. male with no documented history of CAD but PMHx significant for hypertension, fatty liver, osteoporosis in 1999 at [facility]; he has been treated with methadone. Belinda Warren was told that the methadone may have played a role in developing fatty liver as well as his obesity, when he weighed 260 pounds. He also has longstanding history of DVT and PE; he is on chronic Coumadin therapy. Whenever his Coumadin is held for any kind of procedure, he developed blood clots.  He has always taken Lovenox when off of Coumadin. On 11/13/2019, he presented to [facility] with severe chest pain. Belinda Warren was evaluated and discharged home.  Within 24 hours, he returned to [facility] and was hospitalized for 5 days. Belinda Warren had nuclear scan performed which showed abnormal findings with small area in the anterior wall as well as medium size area in the apex and lateral wall, both reversible.  His EF was noted to be 65%.      He was seen by Dr. Elicia Brown from [facility] cardiology who recommended heart catheterization; he was seen by Dr. Avel Sanderson for second opinion. He has never smoked cigarettes. Belinda Warren denies knowledge of hyperlipidemia. Abraham Clemente is family history of early CAD with his father having coronary issues around the age of 39.      For his blood pressure, he was tried on HCTZ which may have precipitated the pancreatitis. Belinda Warren was also tried on metoprolol which caused severe nausea and emesis.  He was tried on amlodipine but was discontinued due to causing severe panic attacks. He underwent coronary angiography in December 2019 which showed widely patent, right dominant epicardial coronary circulation.  His EF was noted to be normal at 60%. He follows up today re: elevated blood pressures. He reports home readings in the 160s-170s associated with headaches.  His home blood pressure monitor was compared to office reading to check for accuracy and monitor is okay for continued use. He denies chest pain, tightness, heaviness, and palpitations. Denies shortness of breath at rest, dyspnea on exertion, orthopnea and PND. Denies lightheadedness, dizziness, and syncope. Denies claudication.         Past Medical History:  Past Medical History:   Diagnosis Date    Degenerative joint disease     Depression     Fibromyalgia     Gynecomastia     [address]ogonadotropic hypogonadism ([facility] [address]     Low testosterone 2018    Sleep apnea     Testicle pain     Thrombophlebitis     Umbilical hernia        Surgical History:  Past Surgical History:   Procedure Laterality Date    HX COLONOSCOPY      HX OTHER SURGICAL      orthopedic surgery    HX TONSILLECTOMY      Lacretia Helga Layne        Social History:  Social History     Socioeconomic History    Marital status:      Spouse name: Not on file    Number of children: Not on file    Years of education: Not on file    Highest education level: Not on file   Occupational History    Not on file   Social Needs    Financial resource strain: Not on file    Food insecurity     Worry: Not on file     Inability: Not on file    Transportation needs     Medical: Not on file     Non-medical: Not on file   Tobacco Use    Smoking status: Never Smoker    Smokeless tobacco: Never Used   Substance and Sexual Activity    Alcohol use: No    Drug use: No    Sexual activity: Not on file   Lifestyle    Physical activity     Days per week: Not on file     Minutes per session: Not on file    Stress: Not on file   Relationships    Social connections     Talks on phone: Not on file     Gets together: Not on file     Attends Baptism service: Not on file     Active member of club or organization: Not on file     Attends meetings of clubs or organizations: Not on file     Relationship status: Not on file    Intimate partner violence     Fear of current or ex partner: Not on file     Emotionally abused: Not on file     Physically abused: Not on file     Forced sexual activity: Not on file   Other Topics Concern    Not on file   Social History Narrative    Not on file        Family History:  Family History   Problem Relation Age of Onset    Cancer Father     Heart Disease Father     Hypertension Father     Stroke Father         Allergies: Allergies   Allergen Reactions    Latex Rash    Bee Sting [Sting, Bee] Other (comments)    Ciprofloxacin Myalgia and Swelling    Erythromycin Other (comments)     Gi distress      Hydrochlorothiazide Other (comments)     Pancreatic issues    Levaquin [Levofloxacin] Other (comments)     Joint pain    Percocet [Oxycodone-Acetaminophen] Other (comments)     Gi distress          Current Medications:  Current Outpatient Medications   Medication Sig Dispense Refill    testosterone cypionate (DEPOTESTOTERONE CYPIONATE) 200 mg/mL injection testosterone cypionate 200 mg/mL intramuscular oil   USE ONE ML Q 3 WEEKS      warfarin (Coumadin) 3 mg tablet Take 1 Tab by mouth daily. 90 Tab 3    losartan (COZAAR) 100 mg tablet take 1 tablet by mouth daily 30 Tab 6    HYDROmorphone (DILAUDID) 4 mg tablet Take  by mouth every three (3) hours as needed for Pain.  methadone (DOLOPHINE) 10 mg tablet Take  by mouth every four (4) hours as needed for Pain. Review of systems:  Review of Systems   Constitutional: Negative for malaise/fatigue. Respiratory: Negative for shortness of breath. Cardiovascular: Positive for leg swelling. Negative for chest pain, palpitations, orthopnea, claudication and PND. Gastrointestinal: Negative for blood in stool. Musculoskeletal: Negative for falls and myalgias.         Wt Readings from Last 3 Encounters:   01/07/21 110.7 kg (244 lb)   03/03/20 103 kg (227 lb)   02/11/20 107.5 kg (237 lb)     BP Readings from Last 3 Encounters:   01/07/21 (!) 160/74   03/03/20 (!) 160/92   02/11/20 132/82     Pulse Readings from Last 3 Encounters: 21 62   20 66   20 (!) 48     EK.15.20: No Ekg performed today. Physical Exam:  Physical Exam  Constitutional:       Appearance: Normal appearance. HENT:      Head: Normocephalic and atraumatic. Eyes:      Extraocular Movements: Extraocular movements intact. Pupils: Pupils are equal, round, and reactive to light. Neck:      Musculoskeletal: Normal range of motion and neck supple. Cardiovascular:      Rate and Rhythm: Normal rate and regular rhythm. Pulses: Normal pulses. Heart sounds: No murmur. No friction rub. No gallop. Pulmonary:      Effort: Pulmonary effort is normal.      Breath sounds: Normal breath sounds. No wheezing, rhonchi or rales. Chest:      Chest wall: No tenderness. Abdominal:      General: Bowel sounds are normal.      Palpations: Abdomen is soft. Musculoskeletal: Normal range of motion. Right lower leg: No edema. Left lower leg: No edema. Skin:     General: Skin is warm and dry. Neurological:      Mental Status: He is alert and oriented to person, place, and time. Labs  Lab Results   Component Value Date/Time    WBC 8.6 12/10/2019 02:43 PM    HGB 13.2 12/10/2019 02:43 PM    HCT 40.8 12/10/2019 02:43 PM    PLATELET 127  02:43 PM    MCV 87.0 12/10/2019 02:43 PM     Lab Results   Component Value Date/Time    Sodium 142 12/10/2019 02:43 PM    Potassium 3.8 12/10/2019 02:43 PM    Chloride 103 12/10/2019 02:43 PM    CO2 35 (H) 12/10/2019 02:43 PM    Anion gap 4 12/10/2019 02:43 PM    Glucose 95 12/10/2019 02:43 PM    BUN 15 12/10/2019 02:43 PM    Creatinine 1.33 (H) 12/10/2019 02:43 PM    BUN/Creatinine ratio 11 (L) 12/10/2019 02:43 PM    GFR est AA >60 12/10/2019 02:43 PM    GFR est non-AA 55 (L) 12/10/2019 02:43 PM    Calcium 8.6 12/10/2019 02:43 PM     Impression/Plan:    1. Hypertension  - BP not well controlled. Patient reports home readings in the 160s-170s.   - START Spironolactone 25mg daily.    - Continue Losartan 100mg daily.   - Follow up in 2 weeks for BP check. 2. Hx of DVT/PE  - On coumadin for chronic anticoagulation. Follow up in 2 weeks with NP for BP check. Patient encouraged to follow up sooner if symptoms worsen or fail to improve. Thank you for allowing me to participate in the care of your patient. Please do not hesitate to call with questions or concerns.      Sarah Purvis NP

## 2021-01-15 NOTE — PATIENT INSTRUCTIONS
Plan: - START Spironolactone 25mg daily. - Continue Losartan 100mg daily. - Check and monitor blood pressure (BP) for 2 weeks. Check blood pressure 2-3 hours after BP medications are taken. Keep record of your blood pressures. - Be mindful it could take up to 10-14 days before an improvement of blood pressure is seen if medication adjustments were made. - Follow up in 2 weeks for BP check.

## 2021-01-15 NOTE — PROGRESS NOTES
Marco Davalos presents today for No chief complaint on file. Marco Davalos preferred language for health care discussion is english/other. Is someone accompanying this pt? no    Is the patient using any DME equipment during [address]? crane    Depression Screening:  3 most recent PHQ Screens 2/11/2020   Little interest or pleasure in doing things Not at all   Feeling down, depressed, irritable, or hopeless Not at all   Total Score PHQ 2 0       Learning Assessment:  Learning Assessment 2/11/2020   PRIMARY LEARNER Patient   PRIMARY LANGUAGE ENGLISH   LEARNER PREFERENCE PRIMARY DEMONSTRATION   ANSWERED BY Patient   RELATIONSHIP SELF       Abuse Screening:  Abuse Screening Questionnaire 2/11/2020   Do you ever feel afraid of your partner? N   Are you in a relationship with someone who physically or mentally threatens you? N   Is it safe for you to go home? Y       Fall Risk  Fall Risk Assessment, last 12 mths 2/11/2020   Able to walk? Yes   Fall in past 12 months? No       Pt currently taking Anticoagulant therapy? no    Coordination of Care:  1. Have you been to the ER, urgent care clinic since your last visit? Hospitalized since your last visit? no    2. Have you seen or consulted any other health care providers outside of the [address] [place] [address] since your last visit? Include any pap smears or colon screening.  no

## 2021-01-15 NOTE — TELEPHONE ENCOUNTER
Patient called stating that he has left a couple messages but hasn't heard back from anyone. He is stating that he woke up at 4:20am with a severe headache and his BP was 173/88. He states that this is happening more frequently. I transferred the call to Irina.

## 2021-01-20 ENCOUNTER — TELEPHONE (OUTPATIENT)
Dept: CARDIOLOGY CLINIC | Age: 61
End: 2021-01-20

## 2021-05-13 DIAGNOSIS — I10 ESSENTIAL HYPERTENSION: ICD-10-CM

## 2021-05-19 RX ORDER — SPIRONOLACTONE 25 MG/1
25 TABLET ORAL DAILY
Qty: 30 TABLET | Refills: 3 | Status: SHIPPED | OUTPATIENT
Start: 2021-05-19 | End: 2021-09-20

## 2021-09-20 DIAGNOSIS — I10 ESSENTIAL HYPERTENSION: ICD-10-CM

## 2021-09-20 RX ORDER — SPIRONOLACTONE 25 MG/1
TABLET ORAL
Qty: 30 TABLET | Refills: 3 | Status: SHIPPED | OUTPATIENT
Start: 2021-09-20 | End: 2022-01-11 | Stop reason: SDUPTHER

## 2022-01-11 ENCOUNTER — OFFICE VISIT (OUTPATIENT)
Dept: CARDIOLOGY CLINIC | Age: 62
End: 2022-01-11
Payer: COMMERCIAL

## 2022-01-11 VITALS
OXYGEN SATURATION: 99 % | HEART RATE: 62 BPM | DIASTOLIC BLOOD PRESSURE: 78 MMHG | BODY MASS INDEX: 34.65 KG/M2 | SYSTOLIC BLOOD PRESSURE: 136 MMHG | HEIGHT: 70 IN | WEIGHT: 242 LBS

## 2022-01-11 DIAGNOSIS — I82.4Z9 DEEP VEIN THROMBOSIS (DVT) OF DISTAL VEIN OF LOWER EXTREMITY, UNSPECIFIED CHRONICITY, UNSPECIFIED LATERALITY (HCC): ICD-10-CM

## 2022-01-11 DIAGNOSIS — I10 ESSENTIAL HYPERTENSION: ICD-10-CM

## 2022-01-11 DIAGNOSIS — R94.39 ABNORMAL NUCLEAR STRESS TEST: Primary | ICD-10-CM

## 2022-01-11 PROCEDURE — 93000 ELECTROCARDIOGRAM COMPLETE: CPT | Performed by: INTERNAL MEDICINE

## 2022-01-11 PROCEDURE — 99214 OFFICE O/P EST MOD 30 MIN: CPT | Performed by: INTERNAL MEDICINE

## 2022-01-11 RX ORDER — WARFARIN 3 MG/1
3 TABLET ORAL DAILY
Qty: 90 TABLET | Refills: 3 | Status: SHIPPED | OUTPATIENT
Start: 2022-01-11

## 2022-01-11 RX ORDER — SPIRONOLACTONE 25 MG/1
25 TABLET ORAL DAILY
Qty: 90 TABLET | Refills: 3 | Status: SHIPPED | OUTPATIENT
Start: 2022-01-11

## 2022-01-11 NOTE — PROGRESS NOTES
Appointment scheduled with dr Edwin Coker d/t dr strickland is 100% booked.   Appointment is October 3rd at 1130am HISTORY OF PRESENT ILLNESS  Landy Reed is a 59 y.o. male. ASSESSMENT and PLAN    Mr. Papi Hanson has no previous documented history of CAD.  He developed fatty liver in early part of 2019. [facility] was diagnosed with transient osteoporosis in 1999 at [facility]; he has been treated with methadone. [facility] was told that the methadone may have played a role in developing fatty liver as well as his obesity, when he weighed 260 pounds. He also has longstanding history of DVT and PE; he is on chronic Coumadin therapy.  Whenever his Coumadin is held for any kind of procedure, he developed blood clots.  He has always taken Lovenox when off of Coumadin. He has never smoked cigarettes. [facility] does have hypertension but denies knowledge of hyperlipidemia. Grace Juan is family history of early CAD with his father having coronary issues around the age of 39.  For his blood pressure, he was tried on HCTZ which may have precipitated the pancreatitis. [facility] was also tried on metoprolol which caused severe nausea and emesis.  He was tried on amlodipine but was discontinued due to causing severe panic attacks. He has tolerated spironolactone. On 11/13/2019, he presented to [facility] with severe chest pain. [facility] was evaluated and discharged home.  Within 24 hours, he returned to [facility] and was hospitalized for 5 days. Cirilo Pavy of abnormal nuclear scan, coronary angiography was recommended; however, he refused. He came to see me for second opinion and ultimately underwent coronary angiography which showed widely patent right dominant epicardial coronary circulation with normal EF 60% in December 2019. · CAD:    He has no documented history of CAD. · BP:    Well controlled. · Rhythm:    Stable sinus. · CHF:    There is no evidence of decompensated CHF noted. · Weight:    's weight today is 242 pounds. This is unchanged from previous.   His target weight should be around 210 pounds. · Cholesterol:   Target LDL <90. · Anti-platelet:   Remains on Coumadin for prior history of DVTs.    I will see him back every other year.  Ashia you. Encounter Diagnoses   Name Primary?  Abnormal nuclear stress test Yes    Deep vein thrombosis (DVT) of distal vein of lower extremity, unspecified chronicity, unspecified laterality (HCC)     Essential hypertension      current treatment plan is effective, no change in therapy  lab results and schedule of future lab studies reviewed with patient  reviewed diet, exercise and weight control  use of aspirin to prevent MI and TIA's discussed      HPI   Today, Mr. Renetta Bangura has no complaints of chest pains, increased shortness of breath or decreased exercise capacity. He denies any orthopnea or PND. He states that he has been taking spironolactone every other day with good blood pressure control. He denies any palpitation sensation or dizziness. Unfortunately, he has not been able to lose any weight. Review of Systems   Respiratory: Negative for shortness of breath. Cardiovascular: Negative for chest pain, palpitations, orthopnea, claudication, leg swelling and PND. All other systems reviewed and are negative. Physical Exam  Vitals and nursing note reviewed. Constitutional:       Appearance: He is obese. HENT:      Head: Normocephalic. Eyes:      Conjunctiva/sclera: Conjunctivae normal.   Neck:      Vascular: No carotid bruit. Cardiovascular:      Rate and Rhythm: Normal rate and regular rhythm. Pulmonary:      Breath sounds: Normal breath sounds. Abdominal:      Palpations: Abdomen is soft. Musculoskeletal:         General: No swelling. Cervical back: No rigidity. Skin:     General: Skin is warm and dry. Neurological:      General: No focal deficit present. Mental Status: He is alert and oriented to person, place, and time.    Psychiatric:         Mood and Affect: Mood normal.         Behavior: Behavior normal.         PCP: Alondra Lam MD    Past Medical History:   Diagnosis Date    Degenerative joint disease     Depression     Fibromyalgia     Gynecomastia     Hypogonadotropic hypogonadism ([address]     Low testosterone 2018    Sleep apnea     Testicle pain     Thrombophlebitis     Umbilical hernia        Past Surgical History:   Procedure Laterality Date    HX COLONOSCOPY      HX OTHER SURGICAL      orthopedic surgery    [address] Dr[address] Selby Balloon       Current Outpatient Medications   Medication Sig Dispense Refill    warfarin (Coumadin) 3 mg tablet Take 1 Tablet by mouth daily. 90 Tablet 3    spironolactone (ALDACTONE) 25 mg tablet Take 1 Tablet by mouth daily. 90 Tablet 3    losartan (COZAAR) 100 mg tablet take 1 tablet by mouth daily 90 Tab 3    testosterone cypionate (DEPOTESTOTERONE CYPIONATE) 200 mg/mL injection testosterone cypionate 200 mg/mL intramuscular oil   USE ONE ML Q 3 WEEKS      HYDROmorphone (DILAUDID) 4 mg tablet Take  by mouth every three (3) hours as needed for Pain.  methadone (DOLOPHINE) 10 mg tablet Take  by mouth every four (4) hours as needed for Pain. The patient has a family history of    Social History     Tobacco Use    Smoking status: Never Smoker    Smokeless tobacco: Never Used   Substance Use Topics    Alcohol use: No    Drug use: No       No results found for: CHOL, CHOLX, CHLST, CHOLV, HDL, HDLP, LDL, LDLC, DLDLP, TGLX, TRIGL, TRIGP, CHHD, CHHDX     BP Readings from Last 3 Encounters:   01/11/22 136/78   01/15/21 (!) 158/90   01/07/21 (!) 160/74        Pulse Readings from Last 3 Encounters:   01/11/22 62   01/15/21 61   01/07/21 62       Wt Readings from Last 3 Encounters:   01/11/22 109.8 kg (242 lb)   01/15/21 109.2 kg (240 lb 12.8 oz)   01/07/21 110.7 kg (244 lb)         EKG: unchanged from previous tracings, normal sinus rhythm, incomplete RBBB.

## 2022-01-11 NOTE — PROGRESS NOTES
Andressa Pena presents today for   Chief Complaint   Patient presents with    Follow-up     1 year follow up- no complaints        Andressa Pena preferred language for health care discussion is english/other. Is someone accompanying this pt? no    Is the patient using any DME equipment during [address]? no    Depression Screening:  3 most recent PHQ Screens 1/11/2022   Little interest or pleasure in doing things Not at all   Feeling down, depressed, irritable, or hopeless Not at all   Total Score PHQ 2 0       Learning Assessment:  Learning Assessment 2/11/2020   PRIMARY LEARNER Patient   PRIMARY LANGUAGE ENGLISH   LEARNER PREFERENCE PRIMARY DEMONSTRATION   ANSWERED BY Patient   RELATIONSHIP SELF       Abuse Screening:  Abuse Screening Questionnaire 1/11/2022   Do you ever feel afraid of your partner? N   Are you in a relationship with someone who physically or mentally threatens you? N   Is it safe for you to go home? Y       Fall Risk  Fall Risk Assessment, last 12 mths 2/11/2020   Able to walk? Yes   Fall in past 12 months? No       Pt currently taking Anticoagulant therapy? Coumadin 3mg    Coordination of Care:  1. Have you been to the ER, urgent care clinic since your last visit? Hospitalized since your last visit? no    2. Have you seen or consulted any other health care providers outside of the [address] [place] [address] since your last visit? Include any pap smears or colon screening.  no

## 2022-04-02 ASSESSMENT — VISUAL ACUITY
OS_CC: 20/60-1
OD_CC: J2
OS_PH: CC 20/50
OD_CC: J1
OD_SC: 20/50
OD_CC: 20/400
OD_SC: 20/25
OS_SC: 20/30
OS_CC: J1
OS_CC: J2
OS_PH: SC 20/30
OS_CC: J2
OS_PH: SC 20/50
OS_SC: J1
OS_SC: 20/30
OD_SC: 20/20
OD_GLARE: 20/50
OS_SC: 20/25
OS_SC: J1+
OD_SC: 20/40
OS_GLARE: 20/150
OS_SC: 20/20
OS_CC: 20/100
OS_GLARE: 20/150
OS_CC: J1
OS_CC: 20/80
OD_SC: 20/60
OS_CC: 20/50
OD_PH: SC 20/30 -2
OD_CC: J2
OS_CC: 20/200-1
OS_CC: 20/70
OD_GLARE: 20/250
OS_GLARE: 20/50
OD_CC: 20/50
OD_GLARE: 20/150
OD_CC: J1
OS_SC: 20/60
OD_CC: 20/70
OS_SC: 20/25
OD_PH: SC 20/25
OD_SC: 20/60
OD_CC: 20/40
OD_CC: J1
OS_CC: J1
OD_GLARE: 20/50
OS_GLARE: 20/50

## 2022-04-02 ASSESSMENT — TONOMETRY
OS_IOP_MMHG: 15
OD_IOP_MMHG: 14
OD_IOP_MMHG: 15
OD_IOP_MMHG: 14
OD_IOP_MMHG: 15
OD_IOP_MMHG: 14
OD_IOP_MMHG: 15
OD_IOP_MMHG: 13
OD_IOP_MMHG: 12
OD_IOP_MMHG: 13
OS_IOP_MMHG: 14
OS_IOP_MMHG: 14
OS_IOP_MMHG: 16
OS_IOP_MMHG: 14
OS_IOP_MMHG: 12
OS_IOP_MMHG: 12
OS_IOP_MMHG: 14
OS_IOP_MMHG: 15

## 2022-04-02 ASSESSMENT — KERATOMETRY
OS_AXISANGLE_DEGREES: 027
OD_K2POWER_DIOPTERS: 44.50
OS_K1POWER_DIOPTERS: 43.25
OD_K1POWER_DIOPTERS: 43.25
OS_AXISANGLE2_DEGREES: 108
OD_AXISANGLE_DEGREES: 153
OS_K1POWER_DIOPTERS: 43.25
OD_AXISANGLE_DEGREES: 162
OS_K2POWER_DIOPTERS: 43.50
OD_AXISANGLE2_DEGREES: 072
OD_K1POWER_DIOPTERS: 43.00
OS_K2POWER_DIOPTERS: 44.00
OD_AXISANGLE2_DEGREES: 063
OS_AXISANGLE_DEGREES: 018
OD_K2POWER_DIOPTERS: 44.25
OS_AXISANGLE2_DEGREES: 117

## 2022-05-05 RX ORDER — LOSARTAN POTASSIUM 100 MG/1
100 TABLET ORAL DAILY
Qty: 90 TABLET | Refills: 3 | Status: SHIPPED | OUTPATIENT
Start: 2022-05-05

## 2022-06-21 NOTE — PATIENT DISCUSSION
1.  Presbyopia: Rx was given for corrective spectacles if indicated. 2.  Allergic Conjunctivitis OU - Ok to rx Pazeo PRN sample given can also try Zaditor BID OU PRN 3. Blepharitis anterior type OU 4. Dry Eyes OU 5. Cataract OU: Observe6. Dermatochalasis OU 7. Return for an appointment in 1 month for 30 and Glare. tape/untape VF with Dr. Lanie Crowe. Telemetry Bed?: Yes   Admitting Physician: Miguel Tineo [[id number]]   Is this a telephone or verbal order?: This is a telephone order from the admitting physician   Transferring Patient to? Only adjust for transfers between [facility] and [facility] and [facility]):  [address] [facility] [[id number]]

## 2023-05-31 ENCOUNTER — OFFICE VISIT (OUTPATIENT)
Age: 63
End: 2023-05-31
Payer: MEDICARE

## 2023-05-31 VITALS
DIASTOLIC BLOOD PRESSURE: 70 MMHG | HEART RATE: 62 BPM | BODY MASS INDEX: 33.93 KG/M2 | OXYGEN SATURATION: 99 % | HEIGHT: 70 IN | WEIGHT: 237 LBS | SYSTOLIC BLOOD PRESSURE: 130 MMHG

## 2023-05-31 DIAGNOSIS — I82.4Z9 ACUTE DEEP VEIN THROMBOSIS (DVT) OF DISTAL VEIN OF LOWER EXTREMITY, UNSPECIFIED LATERALITY (HCC): ICD-10-CM

## 2023-05-31 DIAGNOSIS — R94.39 ABNORMAL RESULT OF OTHER CARDIOVASCULAR FUNCTION STUDY: Primary | ICD-10-CM

## 2023-05-31 DIAGNOSIS — I10 ESSENTIAL HYPERTENSION: ICD-10-CM

## 2023-05-31 PROBLEM — M25.539 PAIN IN WRIST: Status: ACTIVE | Noted: 2019-04-15

## 2023-05-31 PROBLEM — E66.9 ADULT-ONSET OBESITY: Status: ACTIVE | Noted: 2020-12-08

## 2023-05-31 PROBLEM — H26.9 BILATERAL CATARACTS: Status: ACTIVE | Noted: 2020-06-01

## 2023-05-31 PROBLEM — I82.1: Status: ACTIVE | Noted: 2020-07-05

## 2023-05-31 PROBLEM — R94.31 ELECTROCARDIOGRAM ABNORMAL: Status: ACTIVE | Noted: 2020-12-08

## 2023-05-31 PROBLEM — R79.89 DECREASED TESTOSTERONE LEVEL: Status: ACTIVE | Noted: 2020-07-05

## 2023-05-31 PROBLEM — R63.8 INCREASED BODY MASS INDEX: Status: ACTIVE | Noted: 2017-02-22

## 2023-05-31 PROBLEM — N52.9 ERECTILE DYSFUNCTION: Status: ACTIVE | Noted: 2020-12-08

## 2023-05-31 PROBLEM — I82.409 DEEP VEIN THROMBOSIS (DVT) (HCC): Status: ACTIVE | Noted: 2020-04-08

## 2023-05-31 PROBLEM — I82.409 DEEP VEIN THROMBOSIS OF LOWER EXTREMITY (HCC): Status: ACTIVE | Noted: 2020-07-06

## 2023-05-31 PROBLEM — M10.9 GOUT: Status: ACTIVE | Noted: 2020-07-05

## 2023-05-31 PROBLEM — I82.819 SUPERFICIAL THROMBOSIS OF LOWER EXTREMITY: Status: ACTIVE | Noted: 2020-12-08

## 2023-05-31 PROBLEM — M25.519 SHOULDER PAIN: Status: ACTIVE | Noted: 2020-07-05

## 2023-05-31 PROBLEM — M81.8: Status: ACTIVE | Noted: 2020-07-05

## 2023-05-31 PROBLEM — S90.129A CONTUSION OF TOE: Status: ACTIVE | Noted: 2019-06-26

## 2023-05-31 PROBLEM — J40 BRONCHITIS: Status: ACTIVE | Noted: 2020-12-08

## 2023-05-31 PROBLEM — D64.9 ANEMIA: Status: ACTIVE | Noted: 2019-02-21

## 2023-05-31 PROBLEM — R94.31 PROLONGED QT INTERVAL: Status: ACTIVE | Noted: 2020-07-05

## 2023-05-31 PROBLEM — K42.9 UMBILICAL HERNIA: Status: ACTIVE | Noted: 2020-07-05

## 2023-05-31 PROBLEM — I49.9 CARDIAC ARRHYTHMIA: Status: ACTIVE | Noted: 2019-10-30

## 2023-05-31 PROBLEM — I45.10 RIGHT BUNDLE BRANCH BLOCK: Status: ACTIVE | Noted: 2020-07-05

## 2023-05-31 PROBLEM — N28.1 SIMPLE RENAL CYST: Status: ACTIVE | Noted: 2020-07-05

## 2023-05-31 PROBLEM — F11.20 METHADONE DEPENDENCE (HCC): Status: ACTIVE | Noted: 2020-07-05

## 2023-05-31 PROBLEM — K85.90 ACUTE PANCREATITIS: Status: ACTIVE | Noted: 2019-11-14

## 2023-05-31 PROBLEM — M79.609 PAIN IN LIMB: Status: ACTIVE | Noted: 2020-12-08

## 2023-05-31 PROBLEM — I83.90 VARICOSE VEINS OF LOWER EXTREMITY: Status: ACTIVE | Noted: 2020-12-08

## 2023-05-31 PROBLEM — E29.1 TESTICULAR HYPOFUNCTION: Status: ACTIVE | Noted: 2020-07-05

## 2023-05-31 PROBLEM — E55.9 VITAMIN D DEFICIENCY: Status: ACTIVE | Noted: 2020-07-05

## 2023-05-31 PROCEDURE — 1123F ACP DISCUSS/DSCN MKR DOCD: CPT | Performed by: INTERNAL MEDICINE

## 2023-05-31 PROCEDURE — 93000 ELECTROCARDIOGRAM COMPLETE: CPT | Performed by: INTERNAL MEDICINE

## 2023-05-31 PROCEDURE — 1036F TOBACCO NON-USER: CPT | Performed by: INTERNAL MEDICINE

## 2023-05-31 PROCEDURE — 3078F DIAST BP <80 MM HG: CPT | Performed by: INTERNAL MEDICINE

## 2023-05-31 PROCEDURE — 99214 OFFICE O/P EST MOD 30 MIN: CPT | Performed by: INTERNAL MEDICINE

## 2023-05-31 PROCEDURE — G8427 DOCREV CUR MEDS BY ELIG CLIN: HCPCS | Performed by: INTERNAL MEDICINE

## 2023-05-31 PROCEDURE — 3075F SYST BP GE 130 - 139MM HG: CPT | Performed by: INTERNAL MEDICINE

## 2023-05-31 PROCEDURE — G8417 CALC BMI ABV UP PARAM F/U: HCPCS | Performed by: INTERNAL MEDICINE

## 2023-05-31 PROCEDURE — 3017F COLORECTAL CA SCREEN DOC REV: CPT | Performed by: INTERNAL MEDICINE

## 2023-05-31 RX ORDER — SPIRONOLACTONE 25 MG/1
25 TABLET ORAL DAILY
Qty: 90 TABLET | Refills: 3 | Status: SHIPPED | OUTPATIENT
Start: 2023-05-31

## 2023-05-31 RX ORDER — LOSARTAN POTASSIUM 100 MG/1
100 TABLET ORAL DAILY
Qty: 90 TABLET | Refills: 3 | Status: SHIPPED | OUTPATIENT
Start: 2023-05-31

## 2023-05-31 ASSESSMENT — ANXIETY QUESTIONNAIRES
6. BECOMING EASILY ANNOYED OR IRRITABLE: 0
4. TROUBLE RELAXING: 0
GAD7 TOTAL SCORE: 0
7. FEELING AFRAID AS IF SOMETHING AWFUL MIGHT HAPPEN: 0
2. NOT BEING ABLE TO STOP OR CONTROL WORRYING: 0
3. WORRYING TOO MUCH ABOUT DIFFERENT THINGS: 0
1. FEELING NERVOUS, ANXIOUS, OR ON EDGE: 0
5. BEING SO RESTLESS THAT IT IS HARD TO SIT STILL: 0

## 2023-05-31 ASSESSMENT — PATIENT HEALTH QUESTIONNAIRE - PHQ9
SUM OF ALL RESPONSES TO PHQ QUESTIONS 1-9: 0
SUM OF ALL RESPONSES TO PHQ9 QUESTIONS 1 & 2: 0
2. FEELING DOWN, DEPRESSED OR HOPELESS: 0
1. LITTLE INTEREST OR PLEASURE IN DOING THINGS: 0

## 2023-05-31 NOTE — PROGRESS NOTES
Cash Vitale presents today for   Chief Complaint   Patient presents with    Follow-up     1 year       Cash Vitale preferred language for health care discussion is english/other. Is someone accompanying this pt? no    Is the patient using any DME equipment during OV? no    Depression Screening:  Depression: Not on file        Learning Assessment:  No question data found. Pt currently taking Anticoagulant therapy? Warfarrin 3 mg daily except 1 1/2 tab tue, thurs, and sat    Pt currently taking Antiplatelet therapy ? no      Coordination of Care:  1. Have you been to the ER, urgent care clinic since your last visit? Hospitalized since your last visit? no    2. Have you seen or consulted any other health care providers outside of the [address] [place] [address] since your last visit? Include any pap smears or colon screening.  no

## 2023-05-31 NOTE — PROGRESS NOTES
HISTORY OF PRESENT ILLNESS  Wei Ayl  70 y.o. male     Chief Complaint   Patient presents with    Follow-up     1 year       ASSESSMENT and PLAN    The primary encounter diagnosis was Abnormal result of other cardiovascular function study. Diagnoses of Essential hypertension and Acute deep vein thrombosis (DVT) of distal vein of lower extremity, unspecified laterality ([address] were also pertinent to this visit. Mr. Federico Christy has no previous documented history of CAD. He developed fatty liver in early part of 2019. He was diagnosed with transient osteoporosis in 1999 at [facility]; he has been treated with methadone. He was told that the methadone may have played a role in developing fatty liver as well as his obesity, when he weighed 260 pounds. He also has longstanding history of DVT and PE; he is on chronic Coumadin therapy. Whenever his Coumadin is held for any kind of procedure, he developed blood clots. He has always taken Lovenox when off of Coumadin. He has never smoked cigarettes. He does have hypertension but denies knowledge of hyperlipidemia. There is family history of early CAD with his father having coronary issues around the age of 39. For his blood pressure, he was tried on HCTZ which may have precipitated the pancreatitis. He was also tried on metoprolol which caused severe nausea and emesis. He was tried on amlodipine but was discontinued due to causing severe panic attacks. He has tolerated spironolactone. On 11/13/2019, he presented to [facility] with severe chest pain. He was evaluated and discharged home. Within 24 hours, he returned to [facility] and was hospitalized for 5 days. Because of abnormal nuclear scan, coronary angiography was recommended; however, he refused.   He came to see me for second opinion and ultimately underwent coronary angiography which showed widely patent right dominant epicardial coronary

## 2024-06-04 ENCOUNTER — OFFICE VISIT (OUTPATIENT)
Age: 64
End: 2024-06-04
Payer: MEDICARE

## 2024-06-04 VITALS
BODY MASS INDEX: 34.93 KG/M2 | DIASTOLIC BLOOD PRESSURE: 90 MMHG | HEART RATE: 63 BPM | OXYGEN SATURATION: 98 % | WEIGHT: 244 LBS | HEIGHT: 70 IN | SYSTOLIC BLOOD PRESSURE: 142 MMHG

## 2024-06-04 DIAGNOSIS — R94.39 ABNORMAL RESULT OF OTHER CARDIOVASCULAR FUNCTION STUDY: Primary | ICD-10-CM

## 2024-06-04 DIAGNOSIS — I82.4Z9 ACUTE DEEP VEIN THROMBOSIS (DVT) OF DISTAL VEIN OF LOWER EXTREMITY, UNSPECIFIED LATERALITY (HCC): ICD-10-CM

## 2024-06-04 DIAGNOSIS — I10 ESSENTIAL HYPERTENSION: ICD-10-CM

## 2024-06-04 PROCEDURE — 99214 OFFICE O/P EST MOD 30 MIN: CPT | Performed by: INTERNAL MEDICINE

## 2024-06-04 PROCEDURE — 1123F ACP DISCUSS/DSCN MKR DOCD: CPT | Performed by: INTERNAL MEDICINE

## 2024-06-04 PROCEDURE — 93000 ELECTROCARDIOGRAM COMPLETE: CPT | Performed by: INTERNAL MEDICINE

## 2024-06-04 PROCEDURE — 3080F DIAST BP >= 90 MM HG: CPT | Performed by: INTERNAL MEDICINE

## 2024-06-04 PROCEDURE — 3077F SYST BP >= 140 MM HG: CPT | Performed by: INTERNAL MEDICINE

## 2024-06-04 RX ORDER — MULTIVIT-MIN/IRON/FOLIC ACID/K 18-600-40
CAPSULE ORAL
COMMUNITY

## 2024-06-04 RX ORDER — UBIDECARENONE 75 MG
50 CAPSULE ORAL DAILY
COMMUNITY

## 2024-06-04 ASSESSMENT — PATIENT HEALTH QUESTIONNAIRE - PHQ9
1. LITTLE INTEREST OR PLEASURE IN DOING THINGS: NOT AT ALL
SUM OF ALL RESPONSES TO PHQ QUESTIONS 1-9: 0
SUM OF ALL RESPONSES TO PHQ QUESTIONS 1-9: 0
2. FEELING DOWN, DEPRESSED OR HOPELESS: NOT AT ALL
SUM OF ALL RESPONSES TO PHQ QUESTIONS 1-9: 0
SUM OF ALL RESPONSES TO PHQ QUESTIONS 1-9: 0
SUM OF ALL RESPONSES TO PHQ9 QUESTIONS 1 & 2: 0

## 2024-06-04 NOTE — PROGRESS NOTES
Hunter Bingham presents today for   Chief Complaint   Patient presents with    Follow-up     yearly       Hunter Bingham preferred language for health care discussion is english/other.    Is someone accompanying this pt? no    Is the patient using any DME equipment during OV? no    Depression Screening:  Depression: Not at risk (6/4/2024)    PHQ-2     PHQ-2 Score: 0        Learning Assessment:  Who is the primary learner? Patient    What is the preferred language for health care of the primary learner? ENGLISH    How does the primary learner prefer to learn new concepts? DEMONSTRATION    Answered By patient    Relationship to Learner SELF           Pt currently taking Anticoagulant therapy? no    Pt currently taking Antiplatelet therapy ? warfarin      Coordination of Care:  1. Have you been to the ER, urgent care clinic since your last visit? Hospitalized since your last visit? no    2. Have you seen or consulted any other health care providers outside of the [facility] since your last visit? Include any pap smears or colon screening. no    
and dry.   Neurological:      General: No focal deficit present.      Mental Status: He is alert and oriented to person, place, and time.   Psychiatric:         Mood and Affect: Mood normal.         Behavior: Behavior normal.            PCP: Tee Platt MD    Past Medical History:   Diagnosis Date    Degenerative joint disease     Depression     Fibromyalgia     Gynecomastia     Hypogonadotropic hypogonadism (HCC)     Low testosterone 2018    Sleep apnea     Testicle pain     Thrombophlebitis     Umbilical hernia        Past Surgical History:   Procedure Laterality Date    COLONOSCOPY      OTHER SURGICAL HISTORY      orthopedic surgery    TONSILLECTOMY      VASECTOMY      Dr. Burns       Current Outpatient Medications   Medication Sig Dispense Refill    vitamin B-12 (CYANOCOBALAMIN) 100 MCG tablet Take 0.5 tablets by mouth daily      Cholecalciferol (VITAMIN D) 50 MCG (2000 UT) CAPS capsule Take by mouth      losartan (COZAAR) 100 MG tablet Take 1 tablet by mouth daily 90 tablet 3    warfarin (COUMADIN) 3 MG tablet Take 1 tablet by mouth daily 7 tablet 0    HYDROmorphone (DILAUDID) 4 MG tablet Take 1 tablet by mouth every 3 hours as needed for Pain.      methadone (DOLOPHINE) 10 MG tablet Take 1 tablet by mouth daily.      testosterone cypionate (DEPOTESTOTERONE CYPIONATE) 200 MG/ML injection testosterone cypionate 200 mg/mL intramuscular oil   USE ONE ML Q 3 WEEKS       No current facility-administered medications for this visit.       The patient has a family history of    Social History     Tobacco Use    Smoking status: Never    Smokeless tobacco: Never   Vaping Use    Vaping Use: Never used   Substance Use Topics    Alcohol use: No    Drug use: No       No results found for: \"CHOL\", \"CHLST\", \"CHOLV\", \"HDL\", \"HDLC\", \"LDL\"     BP Readings from Last 3 Encounters:   06/04/24 (!) 142/90   05/31/23 130/70   01/11/22 136/78        Pulse Readings from Last 3 Encounters:   06/04/24 63   05/31/23 62

## 2024-06-05 RX ORDER — LOSARTAN POTASSIUM 100 MG/1
100 TABLET ORAL DAILY
Qty: 90 TABLET | Refills: 3 | Status: SHIPPED | OUTPATIENT
Start: 2024-06-05

## 2025-05-29 RX ORDER — LOSARTAN POTASSIUM 100 MG/1
100 TABLET ORAL DAILY
Qty: 90 TABLET | Refills: 3 | Status: SHIPPED | OUTPATIENT
Start: 2025-05-29

## 2025-06-03 ENCOUNTER — OFFICE VISIT (OUTPATIENT)
Age: 65
End: 2025-06-03
Payer: MEDICARE

## 2025-06-03 VITALS
BODY MASS INDEX: 33.79 KG/M2 | SYSTOLIC BLOOD PRESSURE: 130 MMHG | HEIGHT: 70 IN | HEART RATE: 65 BPM | OXYGEN SATURATION: 96 % | DIASTOLIC BLOOD PRESSURE: 76 MMHG | WEIGHT: 236 LBS

## 2025-06-03 DIAGNOSIS — R07.9 CHEST PAIN, UNSPECIFIED TYPE: ICD-10-CM

## 2025-06-03 DIAGNOSIS — I82.4Z9 ACUTE DEEP VEIN THROMBOSIS (DVT) OF DISTAL VEIN OF LOWER EXTREMITY, UNSPECIFIED LATERALITY (HCC): ICD-10-CM

## 2025-06-03 DIAGNOSIS — R94.39 ABNORMAL RESULT OF OTHER CARDIOVASCULAR FUNCTION STUDY: ICD-10-CM

## 2025-06-03 DIAGNOSIS — I10 ESSENTIAL HYPERTENSION: ICD-10-CM

## 2025-06-03 DIAGNOSIS — Z86.711 HISTORY OF PULMONARY EMBOLUS (PE): Primary | ICD-10-CM

## 2025-06-03 PROCEDURE — 1126F AMNT PAIN NOTED NONE PRSNT: CPT | Performed by: INTERNAL MEDICINE

## 2025-06-03 PROCEDURE — 1159F MED LIST DOCD IN RCRD: CPT | Performed by: INTERNAL MEDICINE

## 2025-06-03 PROCEDURE — 1160F RVW MEDS BY RX/DR IN RCRD: CPT | Performed by: INTERNAL MEDICINE

## 2025-06-03 PROCEDURE — 3075F SYST BP GE 130 - 139MM HG: CPT | Performed by: INTERNAL MEDICINE

## 2025-06-03 PROCEDURE — 1123F ACP DISCUSS/DSCN MKR DOCD: CPT | Performed by: INTERNAL MEDICINE

## 2025-06-03 PROCEDURE — 3078F DIAST BP <80 MM HG: CPT | Performed by: INTERNAL MEDICINE

## 2025-06-03 PROCEDURE — G8427 DOCREV CUR MEDS BY ELIG CLIN: HCPCS | Performed by: INTERNAL MEDICINE

## 2025-06-03 PROCEDURE — 93000 ELECTROCARDIOGRAM COMPLETE: CPT | Performed by: INTERNAL MEDICINE

## 2025-06-03 PROCEDURE — G8417 CALC BMI ABV UP PARAM F/U: HCPCS | Performed by: INTERNAL MEDICINE

## 2025-06-03 PROCEDURE — 3017F COLORECTAL CA SCREEN DOC REV: CPT | Performed by: INTERNAL MEDICINE

## 2025-06-03 PROCEDURE — 1036F TOBACCO NON-USER: CPT | Performed by: INTERNAL MEDICINE

## 2025-06-03 PROCEDURE — 99214 OFFICE O/P EST MOD 30 MIN: CPT | Performed by: INTERNAL MEDICINE

## 2025-06-03 ASSESSMENT — PATIENT HEALTH QUESTIONNAIRE - PHQ9
SUM OF ALL RESPONSES TO PHQ QUESTIONS 1-9: 0
SUM OF ALL RESPONSES TO PHQ QUESTIONS 1-9: 0
1. LITTLE INTEREST OR PLEASURE IN DOING THINGS: NOT AT ALL
SUM OF ALL RESPONSES TO PHQ QUESTIONS 1-9: 0
2. FEELING DOWN, DEPRESSED OR HOPELESS: NOT AT ALL
SUM OF ALL RESPONSES TO PHQ QUESTIONS 1-9: 0

## 2025-06-03 NOTE — PROGRESS NOTES
Hunter Bingham presents today for   Chief Complaint   Patient presents with    Follow-up       Hunter Bingham preferred language for health care discussion is english/other.    Is someone accompanying this pt? no    Is the patient using any DME equipment during OV? no    Depression Screening:  Depression: Not at risk (6/3/2025)    PHQ-2     PHQ-2 Score: 0        Learning Assessment:  Who is the primary learner? Patient    What is the preferred language for health care of the primary learner? ENGLISH    How does the primary learner prefer to learn new concepts? DEMONSTRATION    Answered By patient    Relationship to Learner SELF           Pt currently taking Anticoagulant therapy? no    Pt currently taking Antiplatelet therapy ? warfarin      Coordination of Care:  1. Have you been to the ER, urgent care clinic since your last visit? Hospitalized since your last visit? no    2. Have you seen or consulted any other health care providers outside of the [facility] since your last visit? Include any pap smears or colon screening. no

## 2025-06-03 NOTE — PROGRESS NOTES
HISTORY OF PRESENT ILLNESS  Hunter Bingham  65 y.o. male     Chief Complaint   Patient presents with    Follow-up       ASSESSMENT and PLAN    The primary encounter diagnosis was History of pulmonary embolus (PE). Diagnoses of Abnormal result of other cardiovascular function study, Essential hypertension, Acute deep vein thrombosis (DVT) of distal vein of lower extremity, unspecified laterality (HCC), and Chest pain, unspecified type were also pertinent to this visit.    Mr. Hunter Bingham has no previous documented history of CAD.  He developed fatty liver in early part of 2019.  He was diagnosed with transient osteoporosis in 1999 at [facility]; he has been treated with methadone.  He was told that the methadone may have played a role in developing fatty liver as well as his obesity, when he weighed 260 pounds.  He also has longstanding history of DVT and PE; he is on chronic Coumadin therapy.  Whenever his Coumadin is held for any kind of procedure, he developed blood clots.  He has always taken Lovenox when off of Coumadin.  He has never smoked cigarettes.  He does have hypertension but denies knowledge of hyperlipidemia.  There is family history of early CAD with his father having coronary issues around the age of 45.  For his blood pressure, he was tried on HCTZ which may have precipitated the pancreatitis.  He was also tried on metoprolol which caused severe nausea and emesis.  He was tried on amlodipine but was discontinued due to causing severe panic attacks.  He has tolerated spironolactone.  On 11/13/2019, he presented to [facility] with severe chest pain.  He was evaluated and discharged home.  Within 24 hours, he returned to [facility] and was hospitalized for 5 days.  Because of abnormal nuclear scan, coronary angiography was recommended; however, he refused.  He came to see me for second opinion and ultimately underwent coronary angiography which

## 2025-07-09 ENCOUNTER — RESULTS FOLLOW-UP (OUTPATIENT)
Age: 65
End: 2025-07-09

## 2025-07-15 NOTE — TELEPHONE ENCOUNTER
----- Message from Dr. Sal Tucker MD sent at 7/10/2025  8:24 AM EDT -----  Please let the patient know that his echocardiogram shows normal findings.  ----- Message -----  From: Areli Redman LPN  Sent: 7/9/2025   8:39 AM EDT  To: Sal Tucker MD    PER YOUR REQUEST DUE TO CHEST PAINS.

## 2025-07-15 NOTE — TELEPHONE ENCOUNTER
Made contact with patient for results.   Verbal order and read back per Sal Tucker MD  Please let the patient know that his echocardiogram shows normal findings.     He verbally understood results without further questions. If he have future questions he will give the office a call.

## (undated) DEVICE — RADIFOCUS OPTITORQUE ANGIOGRAPHIC CATHETER: Brand: OPTITORQUE

## (undated) DEVICE — SET FLD ADMIN 3 W STPCOCK FIX FEM L BOR 1IN

## (undated) DEVICE — PRESSURE MONITORING SET: Brand: TRUWAVE

## (undated) DEVICE — PROCEDURE KIT FLUID MGMT 10 FR CUST MAINFOLD

## (undated) DEVICE — GLIDESHEATH SLENDER STAINLESS STEEL KIT: Brand: GLIDESHEATH SLENDER

## (undated) DEVICE — ANGIOGRAPHY KIT CUST VASC

## (undated) DEVICE — COVER US PRB W15XL120CM W/ GEL RUBBERBAND TAPE STRP FLD GEN

## (undated) DEVICE — BAND HEMOSTAT DRY D-STAT RAD --

## (undated) DEVICE — PACK PROCEDURE SURG VASC CATH 161 MMC LF